# Patient Record
Sex: FEMALE | Race: BLACK OR AFRICAN AMERICAN | HISPANIC OR LATINO | Employment: OTHER | URBAN - METROPOLITAN AREA
[De-identification: names, ages, dates, MRNs, and addresses within clinical notes are randomized per-mention and may not be internally consistent; named-entity substitution may affect disease eponyms.]

---

## 2021-01-05 ENCOUNTER — OFFICE VISIT (OUTPATIENT)
Dept: CARDIOLOGY | Facility: CLINIC | Age: 71
End: 2021-01-05

## 2021-01-05 VITALS
DIASTOLIC BLOOD PRESSURE: 92 MMHG | TEMPERATURE: 98 F | SYSTOLIC BLOOD PRESSURE: 153 MMHG | BODY MASS INDEX: 37.28 KG/M2 | HEIGHT: 68 IN | HEART RATE: 108 BPM | WEIGHT: 246 LBS

## 2021-01-05 DIAGNOSIS — R94.31 ABNORMAL EKG: Primary | ICD-10-CM

## 2021-01-05 DIAGNOSIS — I48.91 ATRIAL FIBRILLATION, UNSPECIFIED TYPE (HCC): ICD-10-CM

## 2021-01-05 PROCEDURE — 99204 OFFICE O/P NEW MOD 45 MIN: CPT | Performed by: INTERNAL MEDICINE

## 2021-01-05 PROCEDURE — 93000 ELECTROCARDIOGRAM COMPLETE: CPT | Performed by: INTERNAL MEDICINE

## 2021-01-05 RX ORDER — DIAZEPAM 5 MG/1
5 TABLET ORAL 2 TIMES DAILY
COMMUNITY
Start: 2020-12-15

## 2021-01-05 RX ORDER — TRIAMTERENE AND HYDROCHLOROTHIAZIDE 37.5; 25 MG/1; MG/1
1 CAPSULE ORAL EVERY MORNING
COMMUNITY
End: 2021-12-28

## 2021-01-05 RX ORDER — OMEGA-3S/DHA/EPA/FISH OIL/D3 300MG-1000
400 CAPSULE ORAL DAILY
COMMUNITY
Start: 2020-12-19

## 2021-01-05 RX ORDER — OXYCODONE AND ACETAMINOPHEN 10; 325 MG/1; MG/1
1 TABLET ORAL EVERY 6 HOURS PRN
COMMUNITY
Start: 2020-12-19

## 2021-01-05 RX ORDER — APIXABAN 5 MG/1
5 TABLET, FILM COATED ORAL 2 TIMES DAILY
COMMUNITY
Start: 2020-12-08 | End: 2022-10-28 | Stop reason: SDUPTHER

## 2021-01-05 RX ORDER — AMLODIPINE BESYLATE 10 MG/1
TABLET ORAL DAILY
COMMUNITY
Start: 2020-10-10 | End: 2021-12-28

## 2021-01-05 RX ORDER — ACETAMINOPHEN 325 MG/1
650 TABLET ORAL EVERY 6 HOURS PRN
COMMUNITY
End: 2021-12-28

## 2021-01-05 RX ORDER — MULTIVIT WITH MINERALS/LUTEIN
250 TABLET ORAL DAILY
COMMUNITY

## 2021-01-05 RX ORDER — METOPROLOL SUCCINATE 50 MG/1
50 TABLET, EXTENDED RELEASE ORAL DAILY
Qty: 30 TABLET | Refills: 11 | Status: SHIPPED | OUTPATIENT
Start: 2021-01-05 | End: 2021-12-28 | Stop reason: SDUPTHER

## 2021-01-05 RX ORDER — ERGOCALCIFEROL 1.25 MG/1
CAPSULE ORAL
Status: ON HOLD | COMMUNITY
Start: 2020-11-11 | End: 2022-01-19

## 2021-01-05 RX ORDER — ALBUTEROL SULFATE 90 UG/1
2 AEROSOL, METERED RESPIRATORY (INHALATION) EVERY 4 HOURS PRN
COMMUNITY

## 2021-01-05 RX ORDER — TIZANIDINE 4 MG/1
TABLET ORAL
COMMUNITY
Start: 2020-10-19

## 2021-01-05 RX ORDER — CETIRIZINE HYDROCHLORIDE 10 MG/1
10 TABLET ORAL AS NEEDED
COMMUNITY

## 2021-01-05 RX ORDER — LANOLIN ALCOHOL/MO/W.PET/CERES
1000 CREAM (GRAM) TOPICAL DAILY
COMMUNITY

## 2021-01-05 NOTE — PROGRESS NOTES
AFIB, REFERRED BY DR. WESTFALL   Subjective:        [facility]  Cardiology Consult Note    Patient Identification:  Name: Denia Merritt  Age: 70 y.o.  Sex: female  :  1950  MRN: [id number]             CC    NEW ONSET AFIB    NOW ON ANTICOAGULATION  HPI      History of Present Illness:   70-year-old female with known history of hyperlipidemia here for the cardiac evaluation as well as establishment of the care as patient has been found to have new onset atrial fibrillation with rapid ventricular rate recently    Patient complaining of weakness and shortness of breath    Patient has been started on anticoagulation recently    Comorbid cardiac risk factors:     Past Medical History:  Past Medical History:   Diagnosis Date   • Hyperlipidemia      Past Surgical History:  History reviewed. No pertinent surgical history.   Allergies:  No Known Allergies  Home Meds:  (Not in a hospital admission)    Current Meds:   [unfilled]  Social History:   Social History     Tobacco Use   • Smoking status: Former Smoker   • Smokeless tobacco: Never Used   Substance Use Topics   • Alcohol use: Not Currently      Family History:  Family History   Problem Relation Age of Onset   • Hypertension Neg Hx    • Hyperlipidemia Neg Hx    • Heart failure Neg Hx    • Heart disease Neg Hx    • Heart attack Neg Hx         Review of Systems    Constitutional: No weakness,fatigue, fever, rigors, chills   Eyes: No vision changes, eye pain   ENT/oropharynx: No difficulty swallowing, sore throat, epistaxis, changes in hearing   Cardiovascular: No chest pain, chest tightness, palpitations, paroxysmal nocturnal dyspnea, orthopnea, diaphoresis, dizziness / syncopal episode   Respiratory:  Mild shortness of breath, dyspnea on exertion, cough, wheezing hemoptysis   Gastrointestinal: No abdominal pain, nausea, vomiting, diarrhea, bloody stools   Genitourinary: No hematuria, dysuria   Neurological: No headache, tremors, numbness,  one-sided  weakness    Musculoskeletal: No cramps, myalgias,  joint pain, joint swelling   Integument: No rash, edema           Constitutional:  Temp:  [98 °F (36.7 °C)] 98 °F (36.7 °C)  Heart Rate:  [108] 108  BP: (153)/(92) 153/92    Physical Exam   General:  Appears in no acute distress  Eyes: PERTL,  HEENT:  No JVD. Thyroid not visibly enlarged. No mucosal pallor or cyanosis  Respiratory: Respirations regular and unlabored at rest. BBS with good air entry in all fields. No crackles, rubs or wheezes auscultated  Cardiovascular: S1S2 Regular rate and rhythm. No murmur, rub or gallop auscultated. No carotid bruits. DP/PT pulses    . No pretibial pitting edema  Gastrointestinal: Abdomen soft, flat, non tender. Bowel sounds present. No hepatosplenomegaly. No ascites  Musculoskeletal: SEYMOUR x4. No abnormal movements  Extremities: No digital clubbing or cyanosis  Skin: Color pink. Skin warm and dry to touch. No rashes  No xanthoma  Neuro: AAO x3 CN II-XII grossly intact            ECG 12 Lead    Date/Time: 1/5/2021 3:13 PM  Performed by: Racheal Wilson MD  Authorized by: Racheal Wilson MD   Comparison: not compared with previous ECG   Previous ECG: no previous ECG available  Rhythm: atrial fibrillation    Clinical impression: abnormal EKG                Cardiographics  ECG:     Telemetry:    Echocardiogram:     Imaging  Chest X-ray:     Lab Review               @[email or web address]@              Assessment:/ Recommendations / Plan:   Patient Active Problem List   Diagnosis   • Abnormal EKG   • Atrial fibrillation (CMS/HCC)                    ICD-10-CM ICD-9-CM   1. Abnormal EKG  R94.31 794.31   2. Atrial fibrillation, unspecified type (CMS/HCC)  I48.91 427.31     1. Abnormal EKG  Considering the patient's symptoms as well as clinical situation and  EKG findings, along with cardiac risk factors, ischemic workup is necessary to rule out ischemic cardiomyopathy, stress induced arrhythmias, and functional capacity for  diagnosis as well as prognostic consideration    - Stress Test With Myocardial Perfusion (1 Day); Future  - Adult Transthoracic Echo Complete W/ Cont if Necessary Per Protocol    2. Atrial fibrillation, unspecified type (CMS/HCC)  Considering patient's medical condition as well as the risk factors, patient will require echocardiogram for further evaluation for the LV function, four-chamber evaluation, including the pressures, valvular function and  pericardial disease and pericardial effusion    - Stress Test With Myocardial Perfusion (1 Day); Future  - Adult Transthoracic Echo Complete W/ Cont if Necessary Per Protocol       ADD  TOPROL 50 MG    RIC, ECHO    1 MONTH FOR CV    Labs/tests ordered for am      Racheal Wilson MD  1/5/2021, 15:12 EST      EMR Dragon/Transcription:   Dictated utilizing Dragon dictation

## 2021-01-06 PROBLEM — R94.31 ABNORMAL EKG: Status: ACTIVE | Noted: 2021-01-06

## 2021-01-06 PROBLEM — I48.91 ATRIAL FIBRILLATION (HCC): Status: ACTIVE | Noted: 2021-01-06

## 2021-01-08 ENCOUNTER — TRANSCRIBE ORDERS (OUTPATIENT)
Dept: LAB | Facility: HOSPITAL | Age: 71
End: 2021-01-08

## 2021-01-08 DIAGNOSIS — Z01.818 OTHER SPECIFIED PRE-OPERATIVE EXAMINATION: Primary | ICD-10-CM

## 2021-01-09 ENCOUNTER — LAB (OUTPATIENT)
Dept: LAB | Facility: HOSPITAL | Age: 71
End: 2021-01-09

## 2021-01-09 DIAGNOSIS — Z01.818 OTHER SPECIFIED PRE-OPERATIVE EXAMINATION: ICD-10-CM

## 2021-01-09 PROCEDURE — U0004 COV-19 TEST NON-CDC HGH THRU: HCPCS

## 2021-01-09 PROCEDURE — C9803 HOPD COVID-19 SPEC COLLECT: HCPCS

## 2021-01-11 LAB — SARS-COV-2 RNA RESP QL NAA+PROBE: NOT DETECTED

## 2021-01-12 ENCOUNTER — HOSPITAL ENCOUNTER (OUTPATIENT)
Dept: CARDIOLOGY | Facility: HOSPITAL | Age: 71
Discharge: HOME | End: 2021-01-12

## 2021-01-12 ENCOUNTER — HOSPITAL ENCOUNTER (OUTPATIENT)
Dept: CARDIOLOGY | Facility: HOSPITAL | Age: 71
Discharge: HOME | End: 2021-01-12
Admitting: INTERNAL MEDICINE

## 2021-01-12 VITALS
HEART RATE: 75 BPM | HEIGHT: 68 IN | SYSTOLIC BLOOD PRESSURE: 146 MMHG | BODY MASS INDEX: 37.28 KG/M2 | DIASTOLIC BLOOD PRESSURE: 96 MMHG | WEIGHT: 246 LBS

## 2021-01-12 VITALS
RESPIRATION RATE: 16 BRPM | HEART RATE: 83 BPM | WEIGHT: 245 LBS | OXYGEN SATURATION: 97 % | SYSTOLIC BLOOD PRESSURE: 128 MMHG | BODY MASS INDEX: 37.13 KG/M2 | HEIGHT: 68 IN | DIASTOLIC BLOOD PRESSURE: 84 MMHG

## 2021-01-12 DIAGNOSIS — I48.91 ATRIAL FIBRILLATION, UNSPECIFIED TYPE (HCC): ICD-10-CM

## 2021-01-12 DIAGNOSIS — R94.31 ABNORMAL EKG: ICD-10-CM

## 2021-01-12 LAB
AORTIC DIMENSIONLESS INDEX: 0.9 (DI)
BH CV ECHO MEAS - ACS: 1.9 CM
BH CV ECHO MEAS - AO MAX PG (FULL): 1.3 MMHG
BH CV ECHO MEAS - AO MAX PG: 6.1 MMHG
BH CV ECHO MEAS - AO MEAN PG (FULL): 0.46 MMHG
BH CV ECHO MEAS - AO MEAN PG: 2.9 MMHG
BH CV ECHO MEAS - AO ROOT AREA (BSA CORRECTED): 1.2
BH CV ECHO MEAS - AO ROOT AREA: 5.7 CM^2
BH CV ECHO MEAS - AO ROOT DIAM: 2.7 CM
BH CV ECHO MEAS - AO V2 MAX: 123.8 CM/SEC
BH CV ECHO MEAS - AO V2 MEAN: 79.7 CM/SEC
BH CV ECHO MEAS - AO V2 VTI: 26.3 CM
BH CV ECHO MEAS - AVA(I,A): 2.4 CM^2
BH CV ECHO MEAS - AVA(I,D): 2.4 CM^2
BH CV ECHO MEAS - AVA(V,A): 2.4 CM^2
BH CV ECHO MEAS - AVA(V,D): 2.4 CM^2
BH CV ECHO MEAS - BSA(HAYCOCK): 2.4 M^2
BH CV ECHO MEAS - BSA: 2.2 M^2
BH CV ECHO MEAS - BZI_BMI: 37.4 KILOGRAMS/M^2
BH CV ECHO MEAS - BZI_METRIC_HEIGHT: 172.7 CM
BH CV ECHO MEAS - BZI_METRIC_WEIGHT: 111.6 KG
BH CV ECHO MEAS - EDV(CUBED): 124.9 ML
BH CV ECHO MEAS - EDV(MOD-SP2): 63 ML
BH CV ECHO MEAS - EDV(MOD-SP4): 77 ML
BH CV ECHO MEAS - EDV(TEICH): 118.2 ML
BH CV ECHO MEAS - EF(CUBED): 56.8 %
BH CV ECHO MEAS - EF(MOD-BP): 47.6 %
BH CV ECHO MEAS - EF(MOD-SP2): 49.2 %
BH CV ECHO MEAS - EF(MOD-SP4): 49.4 %
BH CV ECHO MEAS - EF(TEICH): 48.3 %
BH CV ECHO MEAS - ESV(CUBED): 54 ML
BH CV ECHO MEAS - ESV(MOD-SP2): 32 ML
BH CV ECHO MEAS - ESV(MOD-SP4): 39 ML
BH CV ECHO MEAS - ESV(TEICH): 61.1 ML
BH CV ECHO MEAS - FS: 24.4 %
BH CV ECHO MEAS - IVS/LVPW: 0.98
BH CV ECHO MEAS - IVSD: 0.85 CM
BH CV ECHO MEAS - LAT PEAK E' VEL: 14 CM/SEC
BH CV ECHO MEAS - LV DIASTOLIC VOL/BSA (35-75): 34.5 ML/M^2
BH CV ECHO MEAS - LV MASS(C)D: 148.8 GRAMS
BH CV ECHO MEAS - LV MASS(C)DI: 66.7 GRAMS/M^2
BH CV ECHO MEAS - LV MAX PG: 4.8 MMHG
BH CV ECHO MEAS - LV MEAN PG: 2.5 MMHG
BH CV ECHO MEAS - LV SYSTOLIC VOL/BSA (12-30): 17.5 ML/M^2
BH CV ECHO MEAS - LV V1 MAX: 109.4 CM/SEC
BH CV ECHO MEAS - LV V1 MEAN: 72.7 CM/SEC
BH CV ECHO MEAS - LV V1 VTI: 23.2 CM
BH CV ECHO MEAS - LVIDD: 5 CM
BH CV ECHO MEAS - LVIDS: 3.8 CM
BH CV ECHO MEAS - LVLD AP2: 6.8 CM
BH CV ECHO MEAS - LVLD AP4: 7.2 CM
BH CV ECHO MEAS - LVLS AP2: 5.7 CM
BH CV ECHO MEAS - LVLS AP4: 6.4 CM
BH CV ECHO MEAS - LVOT AREA (M): 2.8 CM^2
BH CV ECHO MEAS - LVOT AREA: 2.7 CM^2
BH CV ECHO MEAS - LVOT DIAM: 1.9 CM
BH CV ECHO MEAS - LVPWD: 0.87 CM
BH CV ECHO MEAS - MED PEAK E' VEL: 10.8 CM/SEC
BH CV ECHO MEAS - MR MAX PG: 80.1 MMHG
BH CV ECHO MEAS - MR MAX VEL: 447.4 CM/SEC
BH CV ECHO MEAS - MV DEC SLOPE: 687.3 CM/SEC^2
BH CV ECHO MEAS - MV DEC TIME: 166 SEC
BH CV ECHO MEAS - MV E MAX VEL: 126.4 CM/SEC
BH CV ECHO MEAS - MV MAX PG: 6.7 MMHG
BH CV ECHO MEAS - MV MEAN PG: 2.6 MMHG
BH CV ECHO MEAS - MV P1/2T MAX VEL: 137.9 CM/SEC
BH CV ECHO MEAS - MV P1/2T: 58.8 MSEC
BH CV ECHO MEAS - MV V2 MAX: 129.2 CM/SEC
BH CV ECHO MEAS - MV V2 MEAN: 74.1 CM/SEC
BH CV ECHO MEAS - MV V2 VTI: 24.1 CM
BH CV ECHO MEAS - MVA P1/2T LCG: 1.6 CM^2
BH CV ECHO MEAS - MVA(P1/2T): 3.7 CM^2
BH CV ECHO MEAS - MVA(VTI): 2.6 CM^2
BH CV ECHO MEAS - PA ACC TIME: 0.04 SEC
BH CV ECHO MEAS - PA MAX PG: 3.2 MMHG
BH CV ECHO MEAS - PA PR(ACCEL): 61.6 MMHG
BH CV ECHO MEAS - PA V2 MAX: 89.5 CM/SEC
BH CV ECHO MEAS - PULM DIAS VEL: 35.9 CM/SEC
BH CV ECHO MEAS - PULM S/D: 0.91
BH CV ECHO MEAS - PULM SYS VEL: 32.8 CM/SEC
BH CV ECHO MEAS - RAP SYSTOLE: 3 MMHG
BH CV ECHO MEAS - RVSP: 30 MMHG
BH CV ECHO MEAS - SI(AO): 66.8 ML/M^2
BH CV ECHO MEAS - SI(CUBED): 31.8 ML/M^2
BH CV ECHO MEAS - SI(LVOT): 28.3 ML/M^2
BH CV ECHO MEAS - SI(MOD-SP2): 13.9 ML/M^2
BH CV ECHO MEAS - SI(MOD-SP4): 17 ML/M^2
BH CV ECHO MEAS - SI(TEICH): 25.5 ML/M^2
BH CV ECHO MEAS - SV(AO): 149.1 ML
BH CV ECHO MEAS - SV(CUBED): 70.9 ML
BH CV ECHO MEAS - SV(LVOT): 63.1 ML
BH CV ECHO MEAS - SV(MOD-SP2): 31 ML
BH CV ECHO MEAS - SV(MOD-SP4): 38 ML
BH CV ECHO MEAS - SV(TEICH): 57 ML
BH CV ECHO MEAS - TAPSE (>1.6): 2.2 CM
BH CV ECHO MEAS - TR MAX PG: 27 MMHG
BH CV ECHO MEAS - TR MAX VEL: 261.8 CM/SEC
BH CV ECHO MEASUREMENTS AVERAGE E/E' RATIO: 10.19
BH CV XLRA - RV BASE: 3.5 CM
BH CV XLRA - RV LENGTH: 6.2 CM
BH CV XLRA - RV MID: 2.7 CM
BH CV XLRA - TDI S': 14.9 CM/SEC
LEFT ATRIUM VOLUME INDEX: 31.3 ML/M2

## 2021-01-12 PROCEDURE — 78452 HT MUSCLE IMAGE SPECT MULT: CPT | Performed by: INTERNAL MEDICINE

## 2021-01-12 PROCEDURE — 93306 TTE W/DOPPLER COMPLETE: CPT

## 2021-01-12 PROCEDURE — 93016 CV STRESS TEST SUPVJ ONLY: CPT | Performed by: NURSE PRACTITIONER

## 2021-01-12 PROCEDURE — 78452 HT MUSCLE IMAGE SPECT MULT: CPT

## 2021-01-12 PROCEDURE — 93306 TTE W/DOPPLER COMPLETE: CPT | Performed by: INTERNAL MEDICINE

## 2021-01-12 PROCEDURE — A9500 TC99M SESTAMIBI: HCPCS | Performed by: INTERNAL MEDICINE

## 2021-01-12 PROCEDURE — 25010000002 REGADENOSON 0.4 MG/5ML SOLUTION: Performed by: INTERNAL MEDICINE

## 2021-01-12 PROCEDURE — 93017 CV STRESS TEST TRACING ONLY: CPT

## 2021-01-12 PROCEDURE — 0 TECHNETIUM SESTAMIBI: Performed by: INTERNAL MEDICINE

## 2021-01-12 PROCEDURE — 93018 CV STRESS TEST I&R ONLY: CPT | Performed by: INTERNAL MEDICINE

## 2021-01-12 RX ADMIN — TECHNETIUM TC 99M SESTAMIBI 1 DOSE: 1 INJECTION INTRAVENOUS at 10:48

## 2021-01-12 RX ADMIN — REGADENOSON 0.4 MG: 0.08 INJECTION, SOLUTION INTRAVENOUS at 10:48

## 2021-01-12 RX ADMIN — TECHNETIUM TC 99M SESTAMIBI 1 DOSE: 1 INJECTION INTRAVENOUS at 08:05

## 2021-01-14 LAB
BH CV STRESS BP STAGE 1: NORMAL
BH CV STRESS COMMENTS STAGE 1: NORMAL
BH CV STRESS DOSE REGADENOSON STAGE 1: 0.4
BH CV STRESS DURATION MIN STAGE 1: 2
BH CV STRESS DURATION SEC STAGE 1: 6
BH CV STRESS GRADE STAGE 1: 0
BH CV STRESS HR STAGE 1: 150
BH CV STRESS METS STAGE 1: 1.5
BH CV STRESS PROTOCOL 1: NORMAL
BH CV STRESS RECOVERY BP: NORMAL MMHG
BH CV STRESS RECOVERY HR: 104 BPM
BH CV STRESS RECOVERY O2: 98 %
BH CV STRESS SPEED STAGE 1: 0.7
BH CV STRESS STAGE 1: 1
LV EF NUC BP: 57 %
MAXIMAL PREDICTED HEART RATE: 149 BPM
PERCENT MAX PREDICTED HR: 100.67 %
STRESS BASELINE BP: NORMAL MMHG
STRESS BASELINE HR: 89 BPM
STRESS O2 SAT REST: 97 %
STRESS PERCENT HR: 118 %
STRESS POST ESTIMATED WORKLOAD: 1.5 METS
STRESS POST EXERCISE DUR MIN: 2 MIN
STRESS POST EXERCISE DUR SEC: 6 SEC
STRESS POST PEAK BP: NORMAL MMHG
STRESS POST PEAK HR: 150 BPM
STRESS TARGET HR: 127 BPM

## 2021-12-28 ENCOUNTER — OFFICE VISIT (OUTPATIENT)
Dept: CARDIOLOGY | Facility: CLINIC | Age: 71
End: 2021-12-28

## 2021-12-28 VITALS
BODY MASS INDEX: 35.92 KG/M2 | HEIGHT: 68 IN | WEIGHT: 237 LBS | DIASTOLIC BLOOD PRESSURE: 87 MMHG | HEART RATE: 64 BPM | SYSTOLIC BLOOD PRESSURE: 124 MMHG

## 2021-12-28 DIAGNOSIS — I48.91 ATRIAL FIBRILLATION, UNSPECIFIED TYPE (HCC): ICD-10-CM

## 2021-12-28 DIAGNOSIS — R94.31 ABNORMAL EKG: Primary | ICD-10-CM

## 2021-12-28 DIAGNOSIS — I10 PRIMARY HYPERTENSION: ICD-10-CM

## 2021-12-28 PROCEDURE — 99213 OFFICE O/P EST LOW 20 MIN: CPT | Performed by: NURSE PRACTITIONER

## 2021-12-28 PROCEDURE — 93000 ELECTROCARDIOGRAM COMPLETE: CPT | Performed by: NURSE PRACTITIONER

## 2021-12-28 RX ORDER — METOPROLOL SUCCINATE 50 MG/1
75 TABLET, EXTENDED RELEASE ORAL DAILY
Qty: 30 TABLET | Refills: 11 | Status: SHIPPED | OUTPATIENT
Start: 2021-12-28 | End: 2022-01-21 | Stop reason: HOSPADM

## 2021-12-28 RX ORDER — POTASSIUM CHLORIDE 750 MG/1
10 TABLET, FILM COATED, EXTENDED RELEASE ORAL DAILY
COMMUNITY
Start: 2021-12-23

## 2021-12-28 RX ORDER — FUROSEMIDE 20 MG/1
20 TABLET ORAL DAILY
COMMUNITY
Start: 2021-12-23

## 2021-12-28 NOTE — PROGRESS NOTES
Subjective:        Denia Merritt is a 71 y.o. female who here for follow up    No chief complaint on file.    Referred by Dr. Morataya for A. fib RVR    HPI    This is a 71-year-old female, who is noted myself.  She has a history of chronic pain, hyperlipidemia, CKD stage III, hypertension, herniated disc, neuropathy and spinal stenosis.  She comes into referral today for atrial fibrillation with RVR.  Her EKG showed fibrillation with heart rate of 135.  No recent lipid panel noted.    Stress test January 2021 indicated a normal myocardial perfusion study with no evidence of ischemia.  Echo in January 2021 revealed EF 46 to 50%, LV diastolic function was indeterminate and negative saline test for PFO.    The following portions of the patient's history were reviewed and updated as appropriate: allergies, current medications, past family history, past medical history, past social history, past surgical history and problem list.    Past Medical History:   Diagnosis Date   • Arrhythmia     atrial fib   • Chronic pain    • Hyperlipidemia    • Hypertension    • Lumbar herniated disc    • Neuropathy    • Spinal stenosis          reports that she has quit smoking. She has never used smokeless tobacco. She reports previous alcohol use. She reports that she does not use drugs.     Family History   Problem Relation Age of Onset   • Heart disease Mother    • Diabetes Father    • Hypertension Neg Hx    • Hyperlipidemia Neg Hx    • Heart failure Neg Hx    • Heart attack Neg Hx        ROS     Review of Systems  Constitutional: No wt loss, fever, fatigue  Gastrointestinal: No nausea, abdominal pain  Behavioral/Psych: No insomnia or anxiety  Cardiovascular : Denies chest pain and shortness of breath.      Objective:           Vitals and nursing note reviewed.   Constitutional:       Appearance: Well-developed.   HENT:      Head: Normocephalic.      Right Ear: External ear normal.      Left Ear: External ear normal.   Neck:       Vascular: No JVD.   Pulmonary:      Effort: Pulmonary effort is normal. No respiratory distress.      Breath sounds: Normal breath sounds. No stridor. No rales.   Cardiovascular:      Normal rate. Regular rhythm.      No gallop.   Pulses:     Intact distal pulses.   Abdominal:      General: Bowel sounds are normal. There is no distension.      Palpations: Abdomen is soft.      Tenderness: There is no abdominal tenderness. There is no guarding.   Musculoskeletal: Normal range of motion.         General: No tenderness.      Cervical back: Normal range of motion. Skin:     General: Skin is warm.   Neurological:      Mental Status: Alert and oriented to person, place, and time.      Deep Tendon Reflexes: Reflexes are normal and symmetric.   Psychiatric:         Judgment: Judgment normal.           ECG 12 Lead    Date/Time: 12/28/2021 10:52 AM  Performed by: Nazia Baird APRN  Authorized by: Nazia Baird APRN   Comparison: compared with previous ECG from 1/5/2021  Rhythm: atrial fibrillation  BPM: 135              January 2021  Interpretation Summary       · Findings consistent with a normal ECG stress test.  · Left ventricular ejection fraction is normal. (Calculated EF = 57%).  · Myocardial perfusion imaging indicates a normal myocardial perfusion study with no evidence of ischemia.  · Impressions are consistent with a low risk study.     Asymptomatic for chest pain. ECG is negative for ischemia.   Ectopy: baseline rhythm is atrial fibrillation with occasional PVC's, no PVC's with exercise, PVC's return in exercise.   B/P is Appropriate for Beta-blocker therapy, Baseline 128/84, Peak (post Lexiscan)128/84  Recovery:  1 min 130/80  2 min 128/78  4 min 128/80  5 min 128/78  Pharmacologic study due to inability to tolerate increasing speed and grade of treadmill due to orthopedic issues, chronic pain and Beta-blocker therapy.  Participated in Low Level exercise and tolerance is poor.     Supervised by:   Nazia HARDY.      Interpretation Summary    · Estimated right ventricular systolic pressure from tricuspid regurgitation is normal (<35 mmHg).  · The left ventricular cavity is borderline dilated.  · Estimated left ventricular EF was in agreement with the calculated left ventricular EF. Left ventricular ejection fraction appears to be 46 - 50%.  · Left ventricular diastolic function was indeterminate.  · Saline test neg for PFO          Current Outpatient Medications:   •  acetaminophen (TYLENOL) 325 MG tablet, Take 650 mg by mouth Every 6 (Six) Hours As Needed for Mild Pain ., Disp: , Rfl:   •  albuterol sulfate  (90 Base) MCG/ACT inhaler, Inhale 2 puffs Every 4 (Four) Hours As Needed for Wheezing., Disp: , Rfl:   •  amLODIPine (NORVASC) 10 MG tablet, Take  by mouth Daily., Disp: , Rfl:   •  cetirizine (zyrTEC) 10 MG tablet, Take 10 mg by mouth Daily., Disp: , Rfl:   •  cholecalciferol (VITAMIN D3) 10 MCG (400 UNIT) tablet, Take 400 Units by mouth Daily., Disp: , Rfl:   •  diazePAM (VALIUM) 5 MG tablet, Take 5 mg by mouth 2 (Two) Times a Day., Disp: , Rfl:   •  Eliquis 5 MG tablet tablet, Take 5 mg by mouth 2 (Two) Times a Day., Disp: , Rfl:   •  metoprolol succinate XL (TOPROL-XL) 50 MG 24 hr tablet, Take 1 tablet by mouth Daily., Disp: 30 tablet, Rfl: 11  •  oxyCODONE-acetaminophen (PERCOCET)  MG per tablet, Take 1 tablet by mouth Every 6 (Six) Hours As Needed., Disp: , Rfl:   •  tiZANidine (ZANAFLEX) 4 MG tablet, TK 1 T PO TID PRN, Disp: , Rfl:   •  triamterene-hydrochlorothiazide (DYAZIDE) 37.5-25 MG per capsule, Take 1 capsule by mouth Every Morning., Disp: , Rfl:   •  vitamin B-12 (CYANOCOBALAMIN) 1000 MCG tablet, Take 1,000 mcg by mouth Daily., Disp: , Rfl:   •  vitamin C (ASCORBIC ACID) 250 MG tablet, Take 250 mg by mouth Daily., Disp: , Rfl:   •  vitamin D (ERGOCALCIFEROL) 1.25 MG (46837 UT) capsule capsule, TK 1 C PO Q WK, Disp: , Rfl:      Assessment:        Patient Active  Problem List   Diagnosis   • Abnormal EKG   • Atrial fibrillation (HCC)               Plan:   1.  A. fib with RVR: She was referred by Dr. Morataya's office for abnormal EKG with A. fib and RVR.  Her ischemic work-up was negative study in January 2021.  EKG shows atrial fibrillation with heart rate of 135.  She has declined to be admitted for IV amiodarone therapy.  She states at  this time she cannot go into the hospital due to her money situation.  She would like to discuss with her family. I will increase her beta-blocker and discontinue her amlodipine.  She will need her blood pressure and heart rate monitored while at home.  She is to call with any concerns of systolic less than 100 or heart rate less than 60.    2.  Hypertension: Today in the office her blood pressures controlled on current medications.  Avoid ACEs due to her CKD stage III per nephrology.    Educated patient on exercising for at least 30 minutes a day for 2 to 3 days a week. Importance of controlling hypertension and blood pressure checkup on the regular basis has been explained. Hypertension as a silent killer has been discussed. Risk reduction of the weight and regular exercises to control the hypertension has been explained.               No diagnosis found.    There are no diagnoses linked to this encounter.    COUNSELING: jennifer Girard was given to patient for the following topics: diagnostic results, risk factor reductions, impressions, risks and benefits of treatment options and importance of treatment compliance .       SMOKING COUNSELING: denies    She states she has not missed any doses of her Eliquis.  I would like her to see Dr. BOWEN in 1 but she says that is too soon and she would like to see him in 3 weeks.  She has been advised to go to the emergency room with any symptoms of her atrial fibrillation.  She verbalized understanding.    Return to ER per EMS for any recurrent symptoms including, but not limited to: chest  pain/pressure/tightness, weakness, Shortness of breath, dizziness, palpitations, near syncope or syncope          Sincerely,   HAYLEY Moe  [facility]  12/28/21  10:31 EST    MIKE Dragon/Transcription disclaimer:   Much of this encounter note is an electronic transcription/translation of spoken language to printed text. The electronic translation of spoken language may permit erroneous, or at times, nonsensical words or phrases to be inadvertently transcribed; Although I have reviewed the note for such errors, some may still exist.

## 2022-01-13 ENCOUNTER — OFFICE VISIT (OUTPATIENT)
Dept: CARDIOLOGY | Facility: CLINIC | Age: 72
End: 2022-01-13

## 2022-01-13 VITALS
WEIGHT: 235 LBS | SYSTOLIC BLOOD PRESSURE: 158 MMHG | HEART RATE: 102 BPM | HEIGHT: 68 IN | BODY MASS INDEX: 35.61 KG/M2 | DIASTOLIC BLOOD PRESSURE: 98 MMHG

## 2022-01-13 DIAGNOSIS — Z79.01 CHRONIC ANTICOAGULATION: ICD-10-CM

## 2022-01-13 DIAGNOSIS — Z92.29 HISTORY OF AMIODARONE THERAPY: ICD-10-CM

## 2022-01-13 DIAGNOSIS — I48.91 ATRIAL FIBRILLATION, UNSPECIFIED TYPE: ICD-10-CM

## 2022-01-13 DIAGNOSIS — Z01.810 PRE-OPERATIVE CARDIOVASCULAR EXAMINATION: Primary | ICD-10-CM

## 2022-01-13 PROCEDURE — 99214 OFFICE O/P EST MOD 30 MIN: CPT | Performed by: INTERNAL MEDICINE

## 2022-01-13 PROCEDURE — 93000 ELECTROCARDIOGRAM COMPLETE: CPT | Performed by: INTERNAL MEDICINE

## 2022-01-13 NOTE — PROGRESS NOTES
Per Mj HARDY   2 wk follow up for AFIB with RVR    Subjective:        Denia Merritt is a 71 y.o. female who here for follow up    CC  AFIB  HPI  71 years old female with atrial fibrillation on amiodarone therapy on chronic anticoagulation here for the follow-up with no complaints of chest pains tightness heaviness or the pressure sensation     Problems Addressed this Visit        Cardiac and Vasculature    Atrial fibrillation (HCC)    Relevant Orders    Cardioversion External in Cardiology Department (Completed)    COVID PRE-OP / PRE-PROCEDURE SCREENING ORDER (NO ISOLATION) - Swab, Nasopharynx    CBC & Differential    Basic Metabolic Panel    aPTT    Protime-INR    Pre-operative cardiovascular examination - Primary    Relevant Orders    COVID PRE-OP / PRE-PROCEDURE SCREENING ORDER (NO ISOLATION) - Swab, Nasopharynx    CBC & Differential    Basic Metabolic Panel    aPTT    Protime-INR    History of amiodarone therapy       Coag and Thromboembolic    Chronic anticoagulation      Diagnoses       Codes Comments    Pre-operative cardiovascular examination    -  Primary ICD-10-CM: Z01.810  ICD-9-CM: V72.81     Atrial fibrillation, unspecified type (HCC)     ICD-10-CM: I48.91  ICD-9-CM: 427.31     History of amiodarone therapy     ICD-10-CM: Z92.29  ICD-9-CM: V87.49     Chronic anticoagulation     ICD-10-CM: Z79.01  ICD-9-CM: V58.61         .    The following portions of the patient's history were reviewed and updated as appropriate: allergies, current medications, past family history, past medical history, past social history, past surgical history and problem list.    Past Medical History:   Diagnosis Date   • Arrhythmia     atrial fib   • Chronic pain    • Hyperlipidemia    • Hypertension    • Lumbar herniated disc    • Neuropathy    • Spinal stenosis      reports that she has quit smoking. She has never used smokeless tobacco. She reports previous alcohol use. She reports that she does not use drugs.   Family History  "  Problem Relation Age of Onset   • Heart disease Mother    • Diabetes Father    • Hypertension Neg Hx    • Hyperlipidemia Neg Hx    • Heart failure Neg Hx    • Heart attack Neg Hx        Review of Systems  Constitutional: No wt loss, fever, fatigue  Gastrointestinal: No nausea, abdominal pain  Behavioral/Psych: No insomnia or anxiety   Cardiovascular no chest pains or tightness in the chest  Objective:       Physical Exam  /98 Comment: maual  Pulse 102   Ht 172.7 cm (68\")   Wt 107 kg (235 lb)   BMI 35.73 kg/m²   General appearance: No acute changes   Neck: Trachea midline; NECK, supple, no thyromegaly or lymphadenopathy   Lungs: Normal size and shape, normal breath sounds, equal distribution of air, no rales and rhonchi   CV: S1-S2 regular, no murmurs, no rub, no gallop   Abdomen: Soft, nontender; no masses , no abnormal abdominal sounds   Extremities: No deformity , normal color , no peripheral edema   Skin: Normal temperature, turgor and texture; no rash, ulcers            ECG 12 Lead    Date/Time: 1/13/2022 12:23 PM  Performed by: Racheal Wilson MD  Authorized by: Racheal Wilson MD   Comparison: compared with previous ECG   Similar to previous ECG  Rhythm: atrial fibrillation    Clinical impression: abnormal EKG              Echocardiogram:        Current Outpatient Medications:   •  albuterol sulfate  (90 Base) MCG/ACT inhaler, Inhale 2 puffs Every 4 (Four) Hours As Needed for Wheezing., Disp: , Rfl:   •  Calcet Petites 200-250 MG-UNIT tablet, TAKE TWO TABLETS BY MOUTH EVERY DAY, Disp: , Rfl:   •  cetirizine (zyrTEC) 10 MG tablet, Take 10 mg by mouth Daily., Disp: , Rfl:   •  cholecalciferol (VITAMIN D3) 10 MCG (400 UNIT) tablet, Take 400 Units by mouth Daily., Disp: , Rfl:   •  diazePAM (VALIUM) 5 MG tablet, Take 5 mg by mouth 2 (Two) Times a Day., Disp: , Rfl:   •  Eliquis 5 MG tablet tablet, Take 5 mg by mouth 2 (Two) Times a Day., Disp: , Rfl:   •  furosemide (LASIX) 20 MG " tablet, Take 20 mg by mouth Daily., Disp: , Rfl:   •  metoprolol succinate XL (TOPROL-XL) 50 MG 24 hr tablet, Take 1.5 tablets by mouth Daily., Disp: 30 tablet, Rfl: 11  •  oxyCODONE-acetaminophen (PERCOCET)  MG per tablet, Take 1 tablet by mouth Every 6 (Six) Hours As Needed., Disp: , Rfl:   •  potassium chloride 10 MEQ CR tablet, Take 10 mEq by mouth Daily., Disp: , Rfl:   •  tiZANidine (ZANAFLEX) 4 MG tablet, TK 1 T PO TID PRN, Disp: , Rfl:   •  vitamin B-12 (CYANOCOBALAMIN) 1000 MCG tablet, Take 1,000 mcg by mouth Daily., Disp: , Rfl:   •  vitamin C (ASCORBIC ACID) 250 MG tablet, Take 250 mg by mouth Daily., Disp: , Rfl:   •  vitamin D (ERGOCALCIFEROL) 1.25 MG (17091 UT) capsule capsule, TK 1 C PO Q WK, Disp: , Rfl:    Assessment:        Patient Active Problem List   Diagnosis   • Abnormal EKG   • Atrial fibrillation (HCC)               Plan:            ICD-10-CM ICD-9-CM   1. Pre-operative cardiovascular examination  Z01.810 V72.81   2. Atrial fibrillation, unspecified type (HCC)  I48.91 427.31   3. History of amiodarone therapy  Z92.29 V87.49   4. Chronic anticoagulation  Z79.01 V58.61     1. Atrial fibrillation, unspecified type (HCC)  We will need cardioversion  - Cardioversion External in Cardiology Department  - COVID PRE-OP / PRE-PROCEDURE SCREENING ORDER (NO ISOLATION) - Swab, Nasopharynx  - CBC & Differential  - Basic Metabolic Panel  - aPTT  - Protime-INR    2. Pre-operative cardiovascular examination    - COVID PRE-OP / PRE-PROCEDURE SCREENING ORDER (NO ISOLATION) - Swab, Nasopharynx  - CBC & Differential  - Basic Metabolic Panel  - aPTT  - Protime-INR    3. History of amiodarone therapy  Denia Merritt IS ON AMIODARONE,   Significant side effects associated with this medication has been explained     Pros and cons of the medications has been discussed, decision has been to continue the medication   6 months to yearly checkup for eye examination, thyroid function test, chest x-ray and liver  function test has been advised    Patient understands well      4. Chronic anticoagulation  Pros and cons as well as indication of the anticoagulation has been explained to the patient in detail    There are no obvious complications at this stage    Risk of  the bleedings has been explained    Need for the regular blood workup and adjust the dose has been explained    Need for proper follow-up on anticoagulation also has been explained       ADMIT, START AMIODARONE, CV    COUNSELING:    Denia Girard was given to patient for the following topics: diagnostic results, risk factor reductions, impressions, risks and benefits of treatment options and importance of treatment compliance .       SMOKING COUNSELING:    [unfilled]    Dictated using Dragon dictation

## 2022-01-19 ENCOUNTER — APPOINTMENT (OUTPATIENT)
Dept: CARDIOLOGY | Facility: HOSPITAL | Age: 72
End: 2022-01-19

## 2022-01-19 ENCOUNTER — HOSPITAL ENCOUNTER (OUTPATIENT)
Facility: HOSPITAL | Age: 72
Setting detail: OBSERVATION
Discharge: HOME | End: 2022-01-21
Attending: INTERNAL MEDICINE | Admitting: INTERNAL MEDICINE

## 2022-01-19 ENCOUNTER — APPOINTMENT (OUTPATIENT)
Dept: GENERAL RADIOLOGY | Facility: HOSPITAL | Age: 72
End: 2022-01-19

## 2022-01-19 LAB
ALBUMIN SERPL-MCNC: 4.2 G/DL (ref 3.5–5.2)
ALBUMIN/GLOB SERPL: 1.4 G/DL
ALP SERPL-CCNC: 76 U/L (ref 39–117)
ALT SERPL W P-5'-P-CCNC: 14 U/L (ref 1–33)
ANION GAP SERPL CALCULATED.3IONS-SCNC: 9 MMOL/L (ref 5–15)
APTT PPP: 32.6 SECONDS (ref 22.7–35.4)
AST SERPL-CCNC: 19 U/L (ref 1–32)
BASOPHILS # BLD AUTO: 0.04 10*3/MM3 (ref 0–0.2)
BASOPHILS NFR BLD AUTO: 0.8 % (ref 0–1.5)
BILIRUB SERPL-MCNC: 0.7 MG/DL (ref 0–1.2)
BUN SERPL-MCNC: 15 MG/DL (ref 8–23)
BUN/CREAT SERPL: 15.3 (ref 7–25)
CALCIUM SPEC-SCNC: 9.4 MG/DL (ref 8.6–10.5)
CHLORIDE SERPL-SCNC: 105 MMOL/L (ref 98–107)
CO2 SERPL-SCNC: 28 MMOL/L (ref 22–29)
CREAT SERPL-MCNC: 0.98 MG/DL (ref 0.57–1)
DEPRECATED RDW RBC AUTO: 41.4 FL (ref 37–54)
EOSINOPHIL # BLD AUTO: 0.14 10*3/MM3 (ref 0–0.4)
EOSINOPHIL NFR BLD AUTO: 2.7 % (ref 0.3–6.2)
ERYTHROCYTE [DISTWIDTH] IN BLOOD BY AUTOMATED COUNT: 13.1 % (ref 12.3–15.4)
GFR SERPL CREATININE-BSD FRML MDRD: 68 ML/MIN/1.73
GLOBULIN UR ELPH-MCNC: 2.9 GM/DL
GLUCOSE SERPL-MCNC: 131 MG/DL (ref 65–99)
HCT VFR BLD AUTO: 41.9 % (ref 34–46.6)
HGB BLD-MCNC: 12.6 G/DL (ref 12–15.9)
IMM GRANULOCYTES # BLD AUTO: 0.01 10*3/MM3 (ref 0–0.05)
IMM GRANULOCYTES NFR BLD AUTO: 0.2 % (ref 0–0.5)
INR PPP: 1.34 (ref 0.9–1.1)
LYMPHOCYTES # BLD AUTO: 1.22 10*3/MM3 (ref 0.7–3.1)
LYMPHOCYTES NFR BLD AUTO: 23.7 % (ref 19.6–45.3)
MAGNESIUM SERPL-MCNC: 1.9 MG/DL (ref 1.6–2.4)
MCH RBC QN AUTO: 26.1 PG (ref 26.6–33)
MCHC RBC AUTO-ENTMCNC: 30.1 G/DL (ref 31.5–35.7)
MCV RBC AUTO: 86.9 FL (ref 79–97)
MONOCYTES # BLD AUTO: 0.34 10*3/MM3 (ref 0.1–0.9)
MONOCYTES NFR BLD AUTO: 6.6 % (ref 5–12)
NEUTROPHILS NFR BLD AUTO: 3.39 10*3/MM3 (ref 1.7–7)
NEUTROPHILS NFR BLD AUTO: 66 % (ref 42.7–76)
NRBC BLD AUTO-RTO: 0 /100 WBC (ref 0–0.2)
NT-PROBNP SERPL-MCNC: 3498 PG/ML (ref 0–900)
PLATELET # BLD AUTO: 212 10*3/MM3 (ref 140–450)
PMV BLD AUTO: 11.7 FL (ref 6–12)
POTASSIUM SERPL-SCNC: 4 MMOL/L (ref 3.5–5.2)
PROT SERPL-MCNC: 7.1 G/DL (ref 6–8.5)
PROTHROMBIN TIME: 16.4 SECONDS (ref 11.7–14.2)
QT INTERVAL: 318 MS
RBC # BLD AUTO: 4.82 10*6/MM3 (ref 3.77–5.28)
SARS-COV-2 ORF1AB RESP QL NAA+PROBE: NOT DETECTED
SODIUM SERPL-SCNC: 142 MMOL/L (ref 136–145)
TROPONIN T SERPL-MCNC: <0.01 NG/ML (ref 0–0.03)
TSH SERPL DL<=0.05 MIU/L-ACNC: 0.84 UIU/ML (ref 0.27–4.2)
WBC NRBC COR # BLD: 5.14 10*3/MM3 (ref 3.4–10.8)

## 2022-01-19 PROCEDURE — 93010 ELECTROCARDIOGRAM REPORT: CPT | Performed by: INTERNAL MEDICINE

## 2022-01-19 PROCEDURE — U0004 COV-19 TEST NON-CDC HGH THRU: HCPCS | Performed by: INTERNAL MEDICINE

## 2022-01-19 PROCEDURE — 85730 THROMBOPLASTIN TIME PARTIAL: CPT

## 2022-01-19 PROCEDURE — 93005 ELECTROCARDIOGRAM TRACING: CPT

## 2022-01-19 PROCEDURE — 83735 ASSAY OF MAGNESIUM: CPT

## 2022-01-19 PROCEDURE — 96365 THER/PROPH/DIAG IV INF INIT: CPT

## 2022-01-19 PROCEDURE — 84484 ASSAY OF TROPONIN QUANT: CPT

## 2022-01-19 PROCEDURE — G0378 HOSPITAL OBSERVATION PER HR: HCPCS

## 2022-01-19 PROCEDURE — 25010000002 AMIODARONE IN DEXTROSE 5% 150-4.21 MG/100ML-% SOLUTION

## 2022-01-19 PROCEDURE — 96376 TX/PRO/DX INJ SAME DRUG ADON: CPT

## 2022-01-19 PROCEDURE — 85610 PROTHROMBIN TIME: CPT

## 2022-01-19 PROCEDURE — 96366 THER/PROPH/DIAG IV INF ADDON: CPT

## 2022-01-19 PROCEDURE — 83880 ASSAY OF NATRIURETIC PEPTIDE: CPT

## 2022-01-19 PROCEDURE — 71045 X-RAY EXAM CHEST 1 VIEW: CPT

## 2022-01-19 PROCEDURE — 25010000002 AMIODARONE IN DEXTROSE 5% 360-4.14 MG/200ML-% SOLUTION

## 2022-01-19 PROCEDURE — 80050 GENERAL HEALTH PANEL: CPT

## 2022-01-19 PROCEDURE — 99219 PR INITIAL OBSERVATION CARE/DAY 50 MINUTES: CPT | Performed by: INTERNAL MEDICINE

## 2022-01-19 RX ORDER — NITROGLYCERIN 0.4 MG/1
0.4 TABLET SUBLINGUAL
Status: DISCONTINUED | OUTPATIENT
Start: 2022-01-19 | End: 2022-01-21 | Stop reason: HOSPADM

## 2022-01-19 RX ORDER — PANTOPRAZOLE SODIUM 40 MG/1
40 TABLET, DELAYED RELEASE ORAL
Status: DISCONTINUED | OUTPATIENT
Start: 2022-01-20 | End: 2022-01-21 | Stop reason: HOSPADM

## 2022-01-19 RX ORDER — FUROSEMIDE 20 MG/1
20 TABLET ORAL DAILY
Status: DISCONTINUED | OUTPATIENT
Start: 2022-01-19 | End: 2022-01-21 | Stop reason: HOSPADM

## 2022-01-19 RX ORDER — ALBUTEROL SULFATE 2.5 MG/3ML
2.5 SOLUTION RESPIRATORY (INHALATION) EVERY 6 HOURS PRN
Status: DISCONTINUED | OUTPATIENT
Start: 2022-01-19 | End: 2022-01-21 | Stop reason: HOSPADM

## 2022-01-19 RX ORDER — POTASSIUM CHLORIDE 750 MG/1
10 TABLET, FILM COATED, EXTENDED RELEASE ORAL DAILY
Status: DISCONTINUED | OUTPATIENT
Start: 2022-01-19 | End: 2022-01-21 | Stop reason: HOSPADM

## 2022-01-19 RX ORDER — SODIUM CHLORIDE 0.9 % (FLUSH) 0.9 %
10 SYRINGE (ML) INJECTION EVERY 12 HOURS SCHEDULED
Status: DISCONTINUED | OUTPATIENT
Start: 2022-01-19 | End: 2022-01-21 | Stop reason: HOSPADM

## 2022-01-19 RX ORDER — SODIUM CHLORIDE 0.9 % (FLUSH) 0.9 %
10 SYRINGE (ML) INJECTION AS NEEDED
Status: DISCONTINUED | OUTPATIENT
Start: 2022-01-19 | End: 2022-01-21 | Stop reason: HOSPADM

## 2022-01-19 RX ORDER — OXYCODONE AND ACETAMINOPHEN 10; 325 MG/1; MG/1
1 TABLET ORAL EVERY 6 HOURS PRN
Status: DISCONTINUED | OUTPATIENT
Start: 2022-01-19 | End: 2022-01-21 | Stop reason: HOSPADM

## 2022-01-19 RX ADMIN — AMIODARONE HYDROCHLORIDE 75 MG: 1.5 INJECTION, SOLUTION INTRAVENOUS at 22:10

## 2022-01-19 RX ADMIN — SODIUM CHLORIDE, PRESERVATIVE FREE 10 ML: 5 INJECTION INTRAVENOUS at 12:11

## 2022-01-19 RX ADMIN — AMIODARONE HYDROCHLORIDE 150 MG: 1.5 INJECTION, SOLUTION INTRAVENOUS at 12:12

## 2022-01-19 RX ADMIN — SODIUM CHLORIDE, PRESERVATIVE FREE 10 ML: 5 INJECTION INTRAVENOUS at 22:16

## 2022-01-19 RX ADMIN — OXYCODONE AND ACETAMINOPHEN 1 TABLET: 325; 10 TABLET ORAL at 14:44

## 2022-01-19 RX ADMIN — POTASSIUM CHLORIDE 10 MEQ: 750 TABLET, EXTENDED RELEASE ORAL at 12:11

## 2022-01-19 RX ADMIN — METOPROLOL SUCCINATE 75 MG: 25 TABLET, EXTENDED RELEASE ORAL at 12:11

## 2022-01-19 RX ADMIN — APIXABAN 5 MG: 5 TABLET, FILM COATED ORAL at 22:10

## 2022-01-19 RX ADMIN — OXYCODONE AND ACETAMINOPHEN 1 TABLET: 325; 10 TABLET ORAL at 22:10

## 2022-01-19 RX ADMIN — APIXABAN 5 MG: 5 TABLET, FILM COATED ORAL at 12:11

## 2022-01-19 RX ADMIN — AMIODARONE HYDROCHLORIDE 1 MG/MIN: 1.8 INJECTION, SOLUTION INTRAVENOUS at 12:36

## 2022-01-19 RX ADMIN — FUROSEMIDE 20 MG: 20 TABLET ORAL at 12:11

## 2022-01-19 RX ADMIN — AMIODARONE HYDROCHLORIDE 0.5 MG/MIN: 1.8 INJECTION, SOLUTION INTRAVENOUS at 17:51

## 2022-01-19 NOTE — CONSULTS
"Internal medicine consult    Referring physician  Dr. BOWEN    Chief complaint  Atrial fibrillation  Admit for cardioversion    Reason for consult   Follow medical problems    History of present illness  71-year-old female with history of atrial fibrillation hypertension hyperlipidemia low back pain admitted to cardiology service from their office for cardioversion in a.m.  Patient complained of shortness of breath but denies any chest pain palpitation.  Patient also denies any fever cough congestion night sweats weight loss or weight gain.  I am asked to follow the patient for medical problem.    Past Medical History:             Diagnosis Date   • Arrhythmia       atrial fib   • Atrial fibrillation (HCC)     • Chronic pain     • Hyperlipidemia     • Hypertension     • Lumbar herniated disc     • Neuropathy     • Spinal stenosis           Past Surgical History:              Procedure Laterality Date   • CHOLECYSTECTOMY       • SUBTOTAL HYSTERECTOMY                 Social History:               Tobacco Use   • Smoking status: Former Smoker       Quit date:        Years since quittin.0   • Smokeless tobacco: Never Used   Substance Use Topics   • Alcohol use: Not Currently      Family History:           Problem Relation Age of Onset   • Heart disease Mother     • Diabetes Father     • Hypertension Neg Hx     • Hyperlipidemia Neg Hx     • Heart failure Neg Hx     • Heart attack Neg Hx        Allergies:  No Known Allergies  Home Meds: Reviewed    Review of Systems:  As above    Physical examination  Blood pressure 128/87, pulse 109, temperature 98.1 °F (36.7 °C), temperature source Oral, resp. rate 16, height 172.7 cm (68\"), weight 107 kg (236 lb 3.2 oz), SpO2 97 %.    General:  Appears in no acute distress  Eyes: PERTL,  HEENT:  No JVD. Thyroid not visibly enlarged. No mucosal pallor or cyanosis  Respiratory:  Normal effort moving air bilaterally no crackles, rubs or wheezes auscultated  Cardiovascular: Irregular " S1S2 No murmur, rub or gallop auscultated.  Gastrointestinal: Abdomen soft, flat, non tender. Bowel sounds present.   Musculoskeletal: SEYMOUR x4. No abnormal movements  Extremities: No digital clubbing or cyanosis  Skin: Color pink. Skin warm and dry to touch. No rashes  No xanthoma  Neuro: AAO x3 CN II-XII grossly intact    LABS  Lab Results (last 24 hours)     Procedure Component Value Units Date/Time    COVID PRE-OP / PRE-PROCEDURE SCREENING ORDER (NO ISOLATION) - Swab, Nasopharynx [[id number]]  (Normal) Collected: 01/19/22 1111    Specimen: Swab from Nasopharynx Updated: 01/19/22 1518    Narrative:      The following orders were created for panel order COVID PRE-OP / PRE-PROCEDURE SCREENING ORDER (NO ISOLATION) - Swab, Nasopharynx.  Procedure                               Abnormality         Status                     ---------                               -----------         ------                     COVID-19,APTIMA PANTHER(...[[id number]]  Normal              Final result                 Please view results for these tests on the individual orders.    COVID-19,APTIMA PANTHER(NANCY),[facility], NP/OP SWAB IN UTM/VTM/SALINE TRANSPORT MEDIA,24 HR TAT - Swab, Nasopharynx [[id number]]  (Normal) Collected: 01/19/22 1111    Specimen: Swab from Nasopharynx Updated: 01/19/22 1518     COVID19 Not Detected    Narrative:      Fact sheet for providers: https://www.fda.gov/media/537339/download     Fact sheet for patients: https://www.fda.gov/media/461936/download    Test performed by RT PCR.    Troponin [[id number]]  (Normal) Collected: 01/19/22 1153    Specimen: Blood Updated: 01/19/22 1318     Troponin T <0.010 ng/mL     Narrative:      Troponin T Reference Range:  <= 0.03 ng/mL-   Negative for AMI  >0.03 ng/mL-     Abnormal for myocardial necrosis.  Clinicians would have to utilize clinical acumen, EKG, Troponin and serial changes to determine if it is an Acute Myocardial Infarction or myocardial injury due to an underlying  chronic condition.       Results may be falsely decreased if patient taking Biotin.      TSH [[id number]]  (Normal) Collected: 01/19/22 1153    Specimen: Blood Updated: 01/19/22 1252     TSH 0.838 uIU/mL     BNP [[id number]]  (Abnormal) Collected: 01/19/22 1153    Specimen: Blood Updated: 01/19/22 1252     proBNP 3,498.0 pg/mL     Narrative:      Among patients with dyspnea, NT-proBNP is highly sensitive for the detection of acute congestive heart failure. In addition NT-proBNP of <300 pg/ml effectively rules out acute congestive heart failure with 99% negative predictive value.    Results may be falsely decreased if patient taking Biotin.      Comprehensive Metabolic Panel [[id number]]  (Abnormal) Collected: 01/19/22 1153    Specimen: Blood Updated: 01/19/22 1250     Glucose 131 mg/dL      BUN 15 mg/dL      Creatinine 0.98 mg/dL      Sodium 142 mmol/L      Potassium 4.0 mmol/L      Chloride 105 mmol/L      CO2 28.0 mmol/L      Calcium 9.4 mg/dL      Total Protein 7.1 g/dL      Albumin 4.20 g/dL      ALT (SGPT) 14 U/L      AST (SGOT) 19 U/L      Alkaline Phosphatase 76 U/L      Total Bilirubin 0.7 mg/dL      eGFR  African Amer 68 mL/min/1.73      Globulin 2.9 gm/dL      A/G Ratio 1.4 g/dL      BUN/Creatinine Ratio 15.3     Anion Gap 9.0 mmol/L     Narrative:      GFR Normal >60  Chronic Kidney Disease <60  Kidney Failure <15      Magnesium [[id number]]  (Normal) Collected: 01/19/22 1153    Specimen: Blood Updated: 01/19/22 1250     Magnesium 1.9 mg/dL     Protime-INR [[id number]]  (Abnormal) Collected: 01/19/22 1153    Specimen: Blood Updated: 01/19/22 1232     Protime 16.4 Seconds      INR 1.34    aPTT [[id number]]  (Normal) Collected: 01/19/22 1153    Specimen: Blood Updated: 01/19/22 1232     PTT 32.6 seconds     CBC Auto Differential [[id number]]  (Abnormal) Collected: 01/19/22 1153    Specimen: Blood Updated: 01/19/22 1222     WBC 5.14 10*3/mm3      RBC 4.82 10*6/mm3      Hemoglobin 12.6 g/dL      Hematocrit 41.9 %       MCV 86.9 fL      MCH 26.1 pg      MCHC 30.1 g/dL      RDW 13.1 %      RDW-SD 41.4 fl      MPV 11.7 fL      Platelets 212 10*3/mm3      Neutrophil % 66.0 %      Lymphocyte % 23.7 %      Monocyte % 6.6 %      Eosinophil % 2.7 %      Basophil % 0.8 %      Immature Grans % 0.2 %      Neutrophils, Absolute 3.39 10*3/mm3      Lymphocytes, Absolute 1.22 10*3/mm3      Monocytes, Absolute 0.34 10*3/mm3      Eosinophils, Absolute 0.14 10*3/mm3      Basophils, Absolute 0.04 10*3/mm3      Immature Grans, Absolute 0.01 10*3/mm3      nRBC 0.0 /100 WBC         Imaging Results (Last 24 Hours)     ** No results found for the last 24 hours. **             ECG 12 Lead  Component   Ref Range & Units 1/19/22 1035   QT Interval   ms 318              HEART RATE= 133  bpm  RR Interval= 452  ms  OK Interval=   ms  P Horizontal Axis=   deg  P Front Axis=   deg  QRSD Interval= 74  ms  QT Interval= 318  ms  QRS Axis= 79  deg  T Wave Axis= 65  deg  - ABNORMAL ECG -  Atrial fibrillation  NO PRIOR TRACING AVAILABLE FOR COMPARISON             Current Facility-Administered Medications:   •  albuterol (PROVENTIL) nebulizer solution 0.083% 2.5 mg/3mL, 2.5 mg, Nebulization, Q6H PRN, Radha Lake APRN  •  [COMPLETED] amiodarone in dextrose 5% (NEXTERONE) loading dose 150mg/100mL, 150 mg, Intravenous, Once, Stopped at 01/19/22 1222 **FOLLOWED BY** amiodarone 360 mg in 200 mL D5W infusion, 1 mg/min, Intravenous, Continuous, Last Rate: 33.3 mL/hr at 01/19/22 1442, 1 mg/min at 01/19/22 1442 **FOLLOWED BY** amiodarone 360 mg in 200 mL D5W infusion, 0.5 mg/min, Intravenous, Continuous, Radha Lake, APRN  •  amiodarone in dextrose 5% (NEXTERONE) loading dose 150mg/100mL, 75 mg, Intravenous, Once, Radha Lake APRN  •  apixaban (ELIQUIS) tablet 5 mg, 5 mg, Oral, BID, Peevey, Radha L, APRN, 5 mg at 01/19/22 1211  •  furosemide (LASIX) tablet 20 mg, 20 mg, Oral, Daily, Radha Lake APRN, 20 mg at 01/19/22 1211  •  metoprolol succinate XL  (TOPROL-XL) 24 hr tablet 75 mg, 75 mg, Oral, Daily, Radha Lake, APRN, 75 mg at 01/19/22 1211  •  nitroglycerin (NITROSTAT) SL tablet 0.4 mg, 0.4 mg, Sublingual, Q5 Min PRN, Radha Lake, APRN  •  oxyCODONE-acetaminophen (PERCOCET)  MG per tablet 1 tablet, 1 tablet, Oral, Q6H PRN, Racheal Wilson MD, 1 tablet at 01/19/22 1444  •  potassium chloride (K-DUR,KLOR-CON) ER tablet 10 mEq, 10 mEq, Oral, Daily, Radha Lake, APRN, 10 mEq at 01/19/22 1211  •  sodium chloride 0.9 % flush 10 mL, 10 mL, Intravenous, Q12H, Jordan Lakea L, APRN, 10 mL at 01/19/22 1211  •  sodium chloride 0.9 % flush 10 mL, 10 mL, Intravenous, PRN, Radha Lake L, APRN     ASSESSMENT  Chronic atrial fibrillation admit for cardioversion  Hypertension  Hyperlipidemia  Diastolic congestive heart failure  Chronic low back pain  Gastroesophageal reflux disease    PLAN  Agree with current care  Amiodarone  Anticoagulation  Cardioversion in a.m.  Continue home medications  Stress ulcer DVT prophylaxis  Check chest x-ray  Repeat labs in a.m.  Check hemoglobin A1c TSH lipid profile  Supportive care  Patient is full code  Discussed with nursing staff  Will follow with Dr. BOWEN and further recommendation according to hospital course    RHODA LEWIS MD

## 2022-01-19 NOTE — H&P
[facility]  History & Physical Note                                                                                    Patient Identification:  Denia Merritt:   71 y.o.  female  1950  [id number]            No chief complaint on file.      Date of Admission:2022    Admitting Physician: Racheal Wilson    Reason for Admission:CARIO VERSION, No chief complaint on file.      History of Present Illness:     This is a 71-year-old female who was known to our service. She has a medical history to include chronic pain, hyperlipidemia, CKD stage III, hypertension, herniated disc, neuropathy and spinal stenosis. She has been admitted for A. fib with RVR. She was seen in office on 2021 by Nazia HARDY. She declined admission at that time. She followed up with Dr. Wilson on 2022 she was still in A. fib with rate in the 140s. At that time she was agreeable to admission. She has been admitted to initiate amiodarone therapy with a planned cardioversion tomorrow. She has been anticoagulated on Eliquis and reports that she has not missed any doses.    Echo 2021 EF 45 to 50%, indeterminate LV diastolic function, full report as below. Stress test 2021 myocardial perfusion imaging indicated a normal study with no evidence of ischemia.    Cardiac Risk Factors: Hypertension, CKD, age, hyperlipidemia    Past Medical History:  Past Medical History:   Diagnosis Date    Arrhythmia     atrial fib    Atrial fibrillation (HCC)     Chronic pain     Hyperlipidemia     Hypertension     Lumbar herniated disc     Neuropathy     Spinal stenosis     at least 3 stable    Past Surgical History:  Past Surgical History:   Procedure Laterality Date    CHOLECYSTECTOMY      SUBTOTAL HYSTERECTOMY      partial, kept ovaries        Social History:   Social History     Tobacco Use    Smoking status: Former Smoker     Quit date:      Years since quittin.0    Smokeless tobacco: Never Used    Substance Use Topics    Alcohol use: Not Currently        Family History:  Family History   Problem Relation Age of Onset    Heart disease Mother     Diabetes Father     Hypertension Neg Hx     Hyperlipidemia Neg Hx     Heart failure Neg Hx     Heart attack Neg Hx           Allergies:  No Known Allergies    Home Meds:  No current facility-administered medications on file prior to encounter.     Current Outpatient Medications on File Prior to Encounter   Medication Sig Dispense Refill    albuterol sulfate  (90 Base) MCG/ACT inhaler Inhale 2 puffs Every 4 (Four) Hours As Needed for Wheezing.      cetirizine (zyrTEC) 10 MG tablet Take 10 mg by mouth As Needed.      cholecalciferol (VITAMIN D3) 10 MCG (400 UNIT) tablet Take 400 Units by mouth Daily.      diazePAM (VALIUM) 5 MG tablet Take 5 mg by mouth 2 (Two) Times a Day.      Eliquis 5 MG tablet tablet Take 5 mg by mouth 2 (Two) Times a Day.      furosemide (LASIX) 20 MG tablet Take 20 mg by mouth Daily.      metoprolol succinate XL (TOPROL-XL) 50 MG 24 hr tablet Take 1.5 tablets by mouth Daily. 30 tablet 11    oxyCODONE-acetaminophen (PERCOCET)  MG per tablet Take 1 tablet by mouth Every 6 (Six) Hours As Needed.      potassium chloride 10 MEQ CR tablet Take 10 mEq by mouth Daily.      vitamin B-12 (CYANOCOBALAMIN) 1000 MCG tablet Take 1,000 mcg by mouth Daily.      vitamin C (ASCORBIC ACID) 250 MG tablet Take 250 mg by mouth Daily.      Calcet Petites 200-250 MG-UNIT tablet TAKE TWO TABLETS BY MOUTH EVERY DAY      tiZANidine (ZANAFLEX) 4 MG tablet TK 1 T PO TID PRN      [DISCONTINUED] vitamin D (ERGOCALCIFEROL) 1.25 MG (93296 UT) capsule capsule TK 1 C PO Q WK           Scheduled Meds:  apixaban, 5 mg, BID  furosemide, 20 mg, Daily  metoprolol succinate XL, 75 mg, Daily  potassium chloride, 10 mEq, Daily  sodium chloride, 10 mL, Q12H            INTAKE AND OUTPUT:    Intake/Output Summary (Last 24 hours) at 1/19/2022 1501  Last data filed at 1/19/2022  "1130  Gross per 24 hour   Intake 120 ml   Output --   Net 120 ml           Review of Systems:  Review of Systems  Constitutional: No wt loss, fever   Gastrointestinal: No nausea , abdominal pain  Behavioral/Psych: No insomnia or anxiety   Cardiovascular ----positive for PALPITATION. All other systems reviewed and are negative          Constitutional:  Temp:  [98.1 °F (36.7 °C)] 98.1 °F (36.7 °C)  Heart Rate:  [125] 125  Resp:  [16] 16  BP: (153)/(93) 153/93    Physical Exam:           Physical Exam  /87 (BP Location: Left arm, Patient Position: Sitting)   Pulse 109   Temp 98.1 °F (36.7 °C) (Oral)   Resp 16   Ht 172.7 cm (68\")   Wt 107 kg (236 lb 3.2 oz)   SpO2 97%   BMI 35.91 kg/m²     General appearance: No acute changes   Eyes: Sclerae conjunctivae normal, pupils reactive   HENT: Atraumatic; oropharynx clear with moist mucous membranes and no mucosal ulcerations;  Neck: Trachea midline; NECK, supple, no thyromegaly or lymphadenopathy   Lungs: Normal size and shape, normal breath sounds, equal distribution of air, no rales and rhonchi   CV: S1-S2 regular, no murmurs, no rub, no gallop   Abdomen: Soft, nontender; no masses , no abnormal abdominal sounds   Extremities: No deformity , normal color , no peripheral edema   Skin: Normal temperature, turgor and texture; no rash, ulcers  Psych: Appropriate affect, alert and oriented to person, place and time                                Cardiographics    ECG:     Comparison ecg 1/13/22      Telemetry:      ECHO:    Interpretation Summary    Estimated right ventricular systolic pressure from tricuspid regurgitation is normal (<35 mmHg).  The left ventricular cavity is borderline dilated.  Estimated left ventricular EF was in agreement with the calculated left ventricular EF. Left ventricular ejection fraction appears to be 46 - 50%.  Left ventricular diastolic function was indeterminate.  Saline test neg for PFO    Interpretation Summary       Findings " consistent with a normal ECG stress test.  Left ventricular ejection fraction is normal. (Calculated EF = 57%).  Myocardial perfusion imaging indicates a normal myocardial perfusion study with no evidence of ischemia.  Impressions are consistent with a low risk study.      Lab Review:  Results from last 7 days   Lab Units 01/19/22  1153   TROPONIN T ng/mL <0.010     Results from last 7 days   Lab Units 01/19/22  1153   MAGNESIUM mg/dL 1.9     Results from last 7 days   Lab Units 01/19/22  1153   SODIUM mmol/L 142   POTASSIUM mmol/L 4.0   BUN mg/dL 15   CREATININE mg/dL 0.98   CALCIUM mg/dL 9.4     @LABRCNTbnp@  Results from last 7 days   Lab Units 01/19/22  1153   WBC 10*3/mm3 5.14   HEMOGLOBIN g/dL 12.6   HEMATOCRIT % 41.9   PLATELETS 10*3/mm3 212     Results from last 7 days   Lab Units 01/19/22  1153   INR  1.34*   APTT seconds 32.6         CXR:    CHADS-VASc Risk Assessment              3 Total Score    1 Hypertension    1 Age 65-74    1 Sex: Female        Criteria that do not apply:    CHF    Age >/= 75    DM    PRIOR STROKE/TIA/THROMBO    Vascular Disease             The following medical decision was discussed in detail with Dr. Wilson    Assessment / Plan:    Atrial fibrillation  Hypertension      Recommendations:    This is a 71-year-old female who is known to our service. She has been admitted for A. fib with RVR. Amiodarone IV per protocol. We will plan for cardioversion tomorrow. She has been anticoagulated on Eliquis and reports that she has not missed any doses. She will receive an additional bolus of IV amiodarone 75 mg tonight.      Patient agreed to cardioversion, procedure risks/benefits(allergy, arrhythmia, skin trauma) and options of the cardioversion have been explained to the patient/family/PO.A.  He/she/all/they voiced understanding and agreement with treatment plan        Labs/tests ordered in am: consult IMN.p.o. after midnight, cardioversion, CBC, BMP, lipid panel, troponin,  "ECG.    Racheal Wilson MD    1/19/2022  15:01 EST    EMR Dragon/Transcription:   \"Dictated utilizing Dragon dictation\".     "

## 2022-01-20 LAB
ALBUMIN SERPL-MCNC: 3.5 G/DL (ref 3.5–5.2)
ALBUMIN/GLOB SERPL: 1.3 G/DL
ALP SERPL-CCNC: 64 U/L (ref 39–117)
ALT SERPL W P-5'-P-CCNC: 11 U/L (ref 1–33)
ANION GAP SERPL CALCULATED.3IONS-SCNC: 14 MMOL/L (ref 5–15)
AST SERPL-CCNC: 18 U/L (ref 1–32)
BASOPHILS # BLD AUTO: 0.05 10*3/MM3 (ref 0–0.2)
BASOPHILS NFR BLD AUTO: 1.1 % (ref 0–1.5)
BILIRUB SERPL-MCNC: 0.4 MG/DL (ref 0–1.2)
BUN SERPL-MCNC: 17 MG/DL (ref 8–23)
BUN/CREAT SERPL: 20.2 (ref 7–25)
CALCIUM SPEC-SCNC: 8.6 MG/DL (ref 8.6–10.5)
CHLORIDE SERPL-SCNC: 105 MMOL/L (ref 98–107)
CHOLEST SERPL-MCNC: 123 MG/DL (ref 0–200)
CO2 SERPL-SCNC: 20 MMOL/L (ref 22–29)
CREAT SERPL-MCNC: 0.84 MG/DL (ref 0.57–1)
DEPRECATED RDW RBC AUTO: 42.4 FL (ref 37–54)
EOSINOPHIL # BLD AUTO: 0.18 10*3/MM3 (ref 0–0.4)
EOSINOPHIL NFR BLD AUTO: 3.9 % (ref 0.3–6.2)
ERYTHROCYTE [DISTWIDTH] IN BLOOD BY AUTOMATED COUNT: 13.5 % (ref 12.3–15.4)
GFR SERPL CREATININE-BSD FRML MDRD: 81 ML/MIN/1.73
GLOBULIN UR ELPH-MCNC: 2.7 GM/DL
GLUCOSE SERPL-MCNC: 81 MG/DL (ref 65–99)
HBA1C MFR BLD: 6.09 % (ref 4.8–5.6)
HCT VFR BLD AUTO: 38.7 % (ref 34–46.6)
HDLC SERPL-MCNC: 34 MG/DL (ref 40–60)
HGB BLD-MCNC: 11.7 G/DL (ref 12–15.9)
LDLC SERPL CALC-MCNC: 74 MG/DL (ref 0–100)
LDLC/HDLC SERPL: 2.19 {RATIO}
LYMPHOCYTES # BLD AUTO: 1.5 10*3/MM3 (ref 0.7–3.1)
LYMPHOCYTES NFR BLD AUTO: 32.4 % (ref 19.6–45.3)
MCH RBC QN AUTO: 26.2 PG (ref 26.6–33)
MCHC RBC AUTO-ENTMCNC: 30.2 G/DL (ref 31.5–35.7)
MCV RBC AUTO: 86.8 FL (ref 79–97)
MONOCYTES # BLD AUTO: 0.48 10*3/MM3 (ref 0.1–0.9)
MONOCYTES NFR BLD AUTO: 10.4 % (ref 5–12)
NEUTROPHILS NFR BLD AUTO: 2.38 10*3/MM3 (ref 1.7–7)
NEUTROPHILS NFR BLD AUTO: 51.3 % (ref 42.7–76)
PLATELET # BLD AUTO: 153 10*3/MM3 (ref 140–450)
PMV BLD AUTO: 13 FL (ref 6–12)
POTASSIUM SERPL-SCNC: 4.1 MMOL/L (ref 3.5–5.2)
PROT SERPL-MCNC: 6.2 G/DL (ref 6–8.5)
QT INTERVAL: 411 MS
RBC # BLD AUTO: 4.46 10*6/MM3 (ref 3.77–5.28)
SODIUM SERPL-SCNC: 139 MMOL/L (ref 136–145)
TRIGL SERPL-MCNC: 73 MG/DL (ref 0–150)
TROPONIN T SERPL-MCNC: <0.01 NG/ML (ref 0–0.03)
TSH SERPL DL<=0.05 MIU/L-ACNC: 2.34 UIU/ML (ref 0.27–4.2)
VLDLC SERPL-MCNC: 15 MG/DL (ref 5–40)
WBC NRBC COR # BLD: 4.63 10*3/MM3 (ref 3.4–10.8)

## 2022-01-20 PROCEDURE — 83036 HEMOGLOBIN GLYCOSYLATED A1C: CPT | Performed by: HOSPITALIST

## 2022-01-20 PROCEDURE — 84484 ASSAY OF TROPONIN QUANT: CPT

## 2022-01-20 PROCEDURE — 96366 THER/PROPH/DIAG IV INF ADDON: CPT

## 2022-01-20 PROCEDURE — 93010 ELECTROCARDIOGRAM REPORT: CPT | Performed by: INTERNAL MEDICINE

## 2022-01-20 PROCEDURE — 93005 ELECTROCARDIOGRAM TRACING: CPT | Performed by: INTERNAL MEDICINE

## 2022-01-20 PROCEDURE — 80061 LIPID PANEL: CPT

## 2022-01-20 PROCEDURE — G0378 HOSPITAL OBSERVATION PER HR: HCPCS

## 2022-01-20 PROCEDURE — 93005 ELECTROCARDIOGRAM TRACING: CPT

## 2022-01-20 PROCEDURE — 80050 GENERAL HEALTH PANEL: CPT | Performed by: HOSPITALIST

## 2022-01-20 PROCEDURE — 25010000002 AMIODARONE IN DEXTROSE 5% 360-4.14 MG/200ML-% SOLUTION

## 2022-01-20 RX ORDER — AMIODARONE HYDROCHLORIDE 200 MG/1
200 TABLET ORAL
Status: DISCONTINUED | OUTPATIENT
Start: 2022-01-20 | End: 2022-01-21 | Stop reason: HOSPADM

## 2022-01-20 RX ADMIN — APIXABAN 5 MG: 5 TABLET, FILM COATED ORAL at 08:56

## 2022-01-20 RX ADMIN — SODIUM CHLORIDE, PRESERVATIVE FREE 10 ML: 5 INJECTION INTRAVENOUS at 21:33

## 2022-01-20 RX ADMIN — AMIODARONE HYDROCHLORIDE 0.5 MG/MIN: 1.8 INJECTION, SOLUTION INTRAVENOUS at 06:51

## 2022-01-20 RX ADMIN — AMIODARONE HYDROCHLORIDE 200 MG: 200 TABLET ORAL at 09:12

## 2022-01-20 RX ADMIN — SODIUM CHLORIDE, PRESERVATIVE FREE 10 ML: 5 INJECTION INTRAVENOUS at 09:19

## 2022-01-20 RX ADMIN — PANTOPRAZOLE SODIUM 40 MG: 40 TABLET, DELAYED RELEASE ORAL at 06:51

## 2022-01-20 RX ADMIN — FUROSEMIDE 20 MG: 20 TABLET ORAL at 08:56

## 2022-01-20 RX ADMIN — OXYCODONE AND ACETAMINOPHEN 1 TABLET: 325; 10 TABLET ORAL at 18:21

## 2022-01-20 RX ADMIN — APIXABAN 5 MG: 5 TABLET, FILM COATED ORAL at 21:33

## 2022-01-20 RX ADMIN — POTASSIUM CHLORIDE 10 MEQ: 750 TABLET, EXTENDED RELEASE ORAL at 08:56

## 2022-01-20 NOTE — PROGRESS NOTES
"Daily progress note    Chief complaint  Status post cardioversion  Doing same  No new complaints  Denies chest pain shortness of breath palpitation    History of present illness  71-year-old female with history of atrial fibrillation hypertension hyperlipidemia low back pain admitted to cardiology service from their office for cardioversion in a.m.  Patient complained of shortness of breath but denies any chest pain palpitation.  Patient also denies any fever cough congestion night sweats weight loss or weight gain.  I am asked to follow the patient for medical problem.    Review of Systems:  As above    Physical examination  Blood pressure 123/78, pulse (!) 42, temperature 97.6 °F (36.4 °C), temperature source Oral, resp. rate 16, height 172.7 cm (68\"), weight 107 kg (236 lb 3.2 oz), SpO2 95 %.    General:  Appears in no acute distress  Eyes: PERTL,  HEENT:  No JVD. Thyroid not visibly enlarged. No mucosal pallor or cyanosis  Respiratory:  Normal effort moving air bilaterally no crackles, rubs or wheezes auscultated  Cardiovascular: Irregular S1S2 No murmur, rub or gallop auscultated.  Gastrointestinal: Abdomen soft, flat, non tender. Bowel sounds present.   Musculoskeletal: SEYMOUR x4. No abnormal movements  Extremities: No digital clubbing or cyanosis  Skin: Color pink. Skin warm and dry to touch. No rashes  No xanthoma  Neuro: AAO x3 CN II-XII grossly intact    LABS  Lab Results (last 24 hours)     Procedure Component Value Units Date/Time    Troponin [[id number]]  (Normal) Collected: 01/20/22 1109    Specimen: Blood Updated: 01/20/22 1310     Troponin T <0.010 ng/mL     Narrative:      Troponin T Reference Range:  <= 0.03 ng/mL-   Negative for AMI  >0.03 ng/mL-     Abnormal for myocardial necrosis.  Clinicians would have to utilize clinical acumen, EKG, Troponin and serial changes to determine if it is an Acute Myocardial Infarction or myocardial injury due to an underlying chronic condition.       Results may be " falsely decreased if patient taking Biotin.      Hemoglobin A1c [[id number]]  (Abnormal) Collected: 01/20/22 0458    Specimen: Blood Updated: 01/20/22 0623     Hemoglobin A1C 6.09 %     Narrative:      Hemoglobin A1C Ranges:    Increased Risk for Diabetes  5.7% to 6.4%  Diabetes                     >= 6.5%  Diabetic Goal                < 7.0%    TSH [[id number]]  (Normal) Collected: 01/20/22 0458    Specimen: Blood Updated: 01/20/22 0618     TSH 2.340 uIU/mL     Comprehensive Metabolic Panel [[id number]]  (Abnormal) Collected: 01/20/22 0458    Specimen: Blood Updated: 01/20/22 0611     Glucose 81 mg/dL      BUN 17 mg/dL      Creatinine 0.84 mg/dL      Sodium 139 mmol/L      Potassium 4.1 mmol/L      Comment: Slight hemolysis detected by analyzer. Results may be affected.        Chloride 105 mmol/L      CO2 20.0 mmol/L      Calcium 8.6 mg/dL      Total Protein 6.2 g/dL      Albumin 3.50 g/dL      ALT (SGPT) 11 U/L      AST (SGOT) 18 U/L      Alkaline Phosphatase 64 U/L      Total Bilirubin 0.4 mg/dL      eGFR   Amer 81 mL/min/1.73      Globulin 2.7 gm/dL      A/G Ratio 1.3 g/dL      BUN/Creatinine Ratio 20.2     Anion Gap 14.0 mmol/L     Narrative:      GFR Normal >60  Chronic Kidney Disease <60  Kidney Failure <15      Lipid Panel [[id number]]  (Abnormal) Collected: 01/20/22 0458    Specimen: Blood Updated: 01/20/22 0611     Total Cholesterol 123 mg/dL      Triglycerides 73 mg/dL      HDL Cholesterol 34 mg/dL      LDL Cholesterol  74 mg/dL      VLDL Cholesterol 15 mg/dL      LDL/HDL Ratio 2.19    Narrative:      Cholesterol Reference Ranges  (U.S. Department of Health and Human Services ATP III Classifications)    Desirable          <200 mg/dL  Borderline High    200-239 mg/dL  High Risk          >240 mg/dL      Triglyceride Reference Ranges  (U.S. Department of Health and Human Services ATP III Classifications)    Normal           <150 mg/dL  Borderline High  150-199 mg/dL  High             200-499 mg/dL  Very  High        >500 mg/dL    HDL Reference Ranges  (U.S. Department of Health and Human Services ATP III Classifcations)    Low     <40 mg/dl (major risk factor for CHD)  High    >60 mg/dl ('negative' risk factor for CHD)        LDL Reference Ranges  (U.S. Department of Health and Human Services ATP III Classifcations)    Optimal          <100 mg/dL  Near Optimal     100-129 mg/dL  Borderline High  130-159 mg/dL  High             160-189 mg/dL  Very High        >189 mg/dL    CBC & Differential [[id number]]  (Abnormal) Collected: 01/20/22 0458    Specimen: Blood Updated: 01/20/22 0610    Narrative:      The following orders were created for panel order CBC & Differential.  Procedure                               Abnormality         Status                     ---------                               -----------         ------                     CBC Auto Differential[[id number]]        Abnormal            Final result                 Please view results for these tests on the individual orders.    CBC Auto Differential [[id number]]  (Abnormal) Collected: 01/20/22 0458    Specimen: Blood Updated: 01/20/22 0610     WBC 4.63 10*3/mm3      RBC 4.46 10*6/mm3      Hemoglobin 11.7 g/dL      Hematocrit 38.7 %      MCV 86.8 fL      MCH 26.2 pg      MCHC 30.2 g/dL      RDW 13.5 %      RDW-SD 42.4 fl      MPV 13.0 fL      Platelets 153 10*3/mm3      Comment: Platelet clumping noted. Platelet count may be falsely decreased due to platelet clumping.         Neutrophil % 51.3 %      Lymphocyte % 32.4 %      Monocyte % 10.4 %      Eosinophil % 3.9 %      Basophil % 1.1 %      Neutrophils, Absolute 2.38 10*3/mm3      Lymphocytes, Absolute 1.50 10*3/mm3      Monocytes, Absolute 0.48 10*3/mm3      Eosinophils, Absolute 0.18 10*3/mm3      Basophils, Absolute 0.05 10*3/mm3     COVID PRE-OP / PRE-PROCEDURE SCREENING ORDER (NO ISOLATION) - Swab, Nasopharynx [[id number]]  (Normal) Collected: 01/19/22 1111    Specimen: Swab from Nasopharynx Updated:  01/19/22 1518    Narrative:      The following orders were created for panel order COVID PRE-OP / PRE-PROCEDURE SCREENING ORDER (NO ISOLATION) - Swab, Nasopharynx.  Procedure                               Abnormality         Status                     ---------                               -----------         ------                     COVID-19,APTIMA PANTHER(...[[id number]]  Normal              Final result                 Please view results for these tests on the individual orders.    COVID-19,APTIMA PANTHER(NANCY),[facility]/ ELOISE, NP/OP SWAB IN UTM/VTM/SALINE TRANSPORT MEDIA,24 HR TAT - Swab, Nasopharynx [[id number]]  (Normal) Collected: 01/19/22 1111    Specimen: Swab from Nasopharynx Updated: 01/19/22 1518     COVID19 Not Detected    Narrative:      Fact sheet for providers: https://www.fda.gov/media/708570/download     Fact sheet for patients: https://www.fda.gov/media/825194/download    Test performed by RT PCR.        Imaging Results (Last 24 Hours)     Procedure Component Value Units Date/Time    XR Chest 1 View [[id number]] Collected: 01/19/22 1810     Updated: 01/19/22 1814    Narrative:      AP CHEST     HISTORY: Shortness of breath     COMPARISON: None available     FINDINGS: There is a small right pleural effusion with some fluid in the  minor fissure. There is some right basilar atelectasis or consolidation.  Cardiomegaly. No acute bony abnormality.       Impression:      Small right pleural effusion with right basilar atelectasis or  consolidation     This report was finalized on 1/19/2022 6:10 PM by Dr. Quan Aj M.D.                ECG 12 Lead  Component   Ref Range & Units 1/19/22 1035   QT Interval   ms 318              HEART RATE= 133  bpm  RR Interval= 452  ms  TX Interval=   ms  P Horizontal Axis=   deg  P Front Axis=   deg  QRSD Interval= 74  ms  QT Interval= 318  ms  QRS Axis= 79  deg  T Wave Axis= 65  deg  - ABNORMAL ECG -  Atrial fibrillation  NO PRIOR TRACING AVAILABLE FOR COMPARISON              Current Facility-Administered Medications:   •  albuterol (PROVENTIL) nebulizer solution 0.083% 2.5 mg/3mL, 2.5 mg, Nebulization, Q6H PRN, Radha Lake APRN  •  amiodarone (PACERONE) tablet 200 mg, 200 mg, Oral, Q24H, Nazia Baird APRN, 200 mg at 01/20/22 0912  •  apixaban (ELIQUIS) tablet 5 mg, 5 mg, Oral, BID, Radha Lake APRN, 5 mg at 01/20/22 0856  •  furosemide (LASIX) tablet 20 mg, 20 mg, Oral, Daily, Radha Lake APRN, 20 mg at 01/20/22 0856  •  metoprolol succinate XL (TOPROL-XL) 24 hr tablet 75 mg, 75 mg, Oral, Daily, Nazia Baird APRN, 75 mg at 01/19/22 1211  •  nitroglycerin (NITROSTAT) SL tablet 0.4 mg, 0.4 mg, Sublingual, Q5 Min PRN, Radha Lake APRN  •  oxyCODONE-acetaminophen (PERCOCET)  MG per tablet 1 tablet, 1 tablet, Oral, Q6H PRN, Racheal Wilson MD, 1 tablet at 01/19/22 2210  •  pantoprazole (PROTONIX) EC tablet 40 mg, 40 mg, Oral, Q AM, Rhoda Umana MD, 40 mg at 01/20/22 0651  •  potassium chloride (K-DUR,KLOR-CON) ER tablet 10 mEq, 10 mEq, Oral, Daily, Radha Lake APRN, 10 mEq at 01/20/22 0856  •  sodium chloride 0.9 % flush 10 mL, 10 mL, Intravenous, Q12H, Radha Lake APRN, 10 mL at 01/20/22 0919  •  sodium chloride 0.9 % flush 10 mL, 10 mL, Intravenous, PRN, Radha Lake, APRN     ASSESSMENT  Chronic atrial fibrillation admit status post cardioversion  Hypertension  Hyperlipidemia  Diastolic congestive heart failure  Chronic low back pain  Gastroesophageal reflux disease    PLAN  CPM  Post cardioversion care  Monitor  Amiodarone  Anticoagulation  Continue home medications  Stress ulcer DVT prophylaxis  Supportive care  Activity as tolerated  Discussed with nursing staff  Will follow with Dr. BOWEN and further recommendation according to hospital course    RHODA UMANA MD

## 2022-01-20 NOTE — PAYOR COMM NOTE
"Fang Quezada (71 y.o. Female)                         ATTENTION;   PRECERT REQUEST FOR OBSERVATION SERVICES, TESTING AND TREATMENT                       REPLY TO [facility] DEPT  [phone] OR CALL .                  Date of Birth Social Security Number Address Home Phone MRN    1950  [id number] [facility] [phone] [id number]    Hinduism Marital Status             Uatsdin        Admission Date Admission Type Admitting Provider Attending Provider Department, Room/Bed    1/19/22 Urgent Racheal Wilson MD Chandiramani, Shanker, MD [address], 2203/1    Discharge Date Discharge Disposition Discharge Destination                         Attending Provider: Racheal Wilson MD    Allergies: No Known Allergies    Isolation: None   Infection: None   Code Status: CPR   Advance Care Planning Activity    Ht: 172.7 cm (68\")   Wt: 107 kg (236 lb 3.2 oz)    Admission Cmt: [facility],  [facility]   Principal Problem: None                Active Insurance as of 1/19/2022     Primary Coverage     Payor Plan Insurance Group Employer/Plan Group    [facility]      Payor Plan Address Payor Plan Phone Number Payor Plan Fax Number Effective Dates    [address] [address] [phone]  6/12/2015 - None Entered    [facility] [address]       Subscriber Name Subscriber Birth Date Member ID       FANG QUEZADA 1950 [id number]           Secondary Coverage     Payor Plan Insurance Group Employer/Plan Group    [facility] MEDICARE REPLACEMENT [facility] MED[employer]ARE REPLACEMENT      Payor Plan Address Payor Plan Phone Number Payor Plan Fax Number Effective Dates    [address] [address] [phone]  12/1/2021 - None Entered    [facility] [address]       Subscriber Name Subscriber Birth Date Member ID       FANG QUEZADA 1950 [id number]                 Emergency Contacts      (Rel.) Home Phone Work Phone Mobile Phone    AC MEREDITH " (Sister) [phone] -- --               History & Physical      Racheal Wilson MD at 01/19/22 1500          [facility]  History & Physical Note                                                                                    Patient Identification:  Denia Merritt:   71 y.o.  female  1950  [id number]            No chief complaint on file.      Date of Admission:1/19/2022    Admitting Physician: Racheal Wilson    Reason for Admission:CARIO VERSION, No chief complaint on file.      History of Present Illness:     This is a 71-year-old female who was known to our service. She has a medical history to include chronic pain, hyperlipidemia, CKD stage III, hypertension, herniated disc, neuropathy and spinal stenosis. She has been admitted for A. fib with RVR. She was seen in office on 12/28/2021 by Nazia HARDY. She declined admission at that time. She followed up with Dr. Wilson on 1/13/2022 she was still in A. fib with rate in the 140s. At that time she was agreeable to admission. She has been admitted to initiate amiodarone therapy with a planned cardioversion tomorrow. She has been anticoagulated on Eliquis and reports that she has not missed any doses.    Echo 1/12/2021 EF 45 to 50%, indeterminate LV diastolic function, full report as below. Stress test 1/12/2021 myocardial perfusion imaging indicated a normal study with no evidence of ischemia.    Cardiac Risk Factors: Hypertension, CKD, age, hyperlipidemia    Past Medical History:  Past Medical History:   Diagnosis Date   • Arrhythmia     atrial fib   • Atrial fibrillation (HCC)    • Chronic pain    • Hyperlipidemia    • Hypertension    • Lumbar herniated disc    • Neuropathy    • Spinal stenosis     at least 3 stable    Past Surgical History:  Past Surgical History:   Procedure Laterality Date   • CHOLECYSTECTOMY     • SUBTOTAL HYSTERECTOMY      partial, kept ovaries        Social History:   Social History      Tobacco Use   • Smoking status: Former Smoker     Quit date:      Years since quittin.0   • Smokeless tobacco: Never Used   Substance Use Topics   • Alcohol use: Not Currently        Family History:  Family History   Problem Relation Age of Onset   • Heart disease Mother    • Diabetes Father    • Hypertension Neg Hx    • Hyperlipidemia Neg Hx    • Heart failure Neg Hx    • Heart attack Neg Hx           Allergies:  No Known Allergies    Home Meds:  No current facility-administered medications on file prior to encounter.     Current Outpatient Medications on File Prior to Encounter   Medication Sig Dispense Refill   • albuterol sulfate  (90 Base) MCG/ACT inhaler Inhale 2 puffs Every 4 (Four) Hours As Needed for Wheezing.     • cetirizine (zyrTEC) 10 MG tablet Take 10 mg by mouth As Needed.     • cholecalciferol (VITAMIN D3) 10 MCG (400 UNIT) tablet Take 400 Units by mouth Daily.     • diazePAM (VALIUM) 5 MG tablet Take 5 mg by mouth 2 (Two) Times a Day.     • Eliquis 5 MG tablet tablet Take 5 mg by mouth 2 (Two) Times a Day.     • furosemide (LASIX) 20 MG tablet Take 20 mg by mouth Daily.     • metoprolol succinate XL (TOPROL-XL) 50 MG 24 hr tablet Take 1.5 tablets by mouth Daily. 30 tablet 11   • oxyCODONE-acetaminophen (PERCOCET)  MG per tablet Take 1 tablet by mouth Every 6 (Six) Hours As Needed.     • potassium chloride 10 MEQ CR tablet Take 10 mEq by mouth Daily.     • vitamin B-12 (CYANOCOBALAMIN) 1000 MCG tablet Take 1,000 mcg by mouth Daily.     • vitamin C (ASCORBIC ACID) 250 MG tablet Take 250 mg by mouth Daily.     • Calcet Petites 200-250 MG-UNIT tablet TAKE TWO TABLETS BY MOUTH EVERY DAY     • tiZANidine (ZANAFLEX) 4 MG tablet TK 1 T PO TID PRN     • [DISCONTINUED] vitamin D (ERGOCALCIFEROL) 1.25 MG (96825 UT) capsule capsule TK 1 C PO Q WK           Scheduled Meds:  apixaban, 5 mg, BID  furosemide, 20 mg, Daily  metoprolol succinate XL, 75 mg, Daily  potassium chloride, 10 mEq,  "Daily  sodium chloride, 10 mL, Q12H            INTAKE AND OUTPUT:    Intake/Output Summary (Last 24 hours) at 1/19/2022 1501  Last data filed at 1/19/2022 1130  Gross per 24 hour   Intake 120 ml   Output --   Net 120 ml           Review of Systems:  Review of Systems  Constitutional: No wt loss, fever   Gastrointestinal: No nausea , abdominal pain  Behavioral/Psych: No insomnia or anxiety   Cardiovascular ----positive for PALPITATION. All other systems reviewed and are negative          Constitutional:  Temp:  [98.1 °F (36.7 °C)] 98.1 °F (36.7 °C)  Heart Rate:  [125] 125  Resp:  [16] 16  BP: (153)/(93) 153/93    Physical Exam:           Physical Exam  /87 (BP Location: Left arm, Patient Position: Sitting)   Pulse 109   Temp 98.1 °F (36.7 °C) (Oral)   Resp 16   Ht 172.7 cm (68\")   Wt 107 kg (236 lb 3.2 oz)   SpO2 97%   BMI 35.91 kg/m²     General appearance: No acute changes   Eyes: Sclerae conjunctivae normal, pupils reactive   HENT: Atraumatic; oropharynx clear with moist mucous membranes and no mucosal ulcerations;  Neck: Trachea midline; NECK, supple, no thyromegaly or lymphadenopathy   Lungs: Normal size and shape, normal breath sounds, equal distribution of air, no rales and rhonchi   CV: S1-S2 regular, no murmurs, no rub, no gallop   Abdomen: Soft, nontender; no masses , no abnormal abdominal sounds   Extremities: No deformity , normal color , no peripheral edema   Skin: Normal temperature, turgor and texture; no rash, ulcers  Psych: Appropriate affect, alert and oriented to person, place and time                                Cardiographics    ECG:     Comparison ecg 1/13/22      Telemetry:      ECHO:    Interpretation Summary    · Estimated right ventricular systolic pressure from tricuspid regurgitation is normal (<35 mmHg).  · The left ventricular cavity is borderline dilated.  · Estimated left ventricular EF was in agreement with the calculated left ventricular EF. Left ventricular ejection " fraction appears to be 46 - 50%.  · Left ventricular diastolic function was indeterminate.  · Saline test neg for PFO    Interpretation Summary       · Findings consistent with a normal ECG stress test.  · Left ventricular ejection fraction is normal. (Calculated EF = 57%).  · Myocardial perfusion imaging indicates a normal myocardial perfusion study with no evidence of ischemia.  · Impressions are consistent with a low risk study.      Lab Review:  Results from last 7 days   Lab Units 01/19/22  1153   TROPONIN T ng/mL <0.010     Results from last 7 days   Lab Units 01/19/22  1153   MAGNESIUM mg/dL 1.9     Results from last 7 days   Lab Units 01/19/22  1153   SODIUM mmol/L 142   POTASSIUM mmol/L 4.0   BUN mg/dL 15   CREATININE mg/dL 0.98   CALCIUM mg/dL 9.4     @LABRCNTbnp@  Results from last 7 days   Lab Units 01/19/22  1153   WBC 10*3/mm3 5.14   HEMOGLOBIN g/dL 12.6   HEMATOCRIT % 41.9   PLATELETS 10*3/mm3 212     Results from last 7 days   Lab Units 01/19/22  1153   INR  1.34*   APTT seconds 32.6         CXR:    CHADS-VASc Risk Assessment              3 Total Score    1 Hypertension    1 Age 65-74    1 Sex: Female        Criteria that do not apply:    CHF    Age >/= 75    DM    PRIOR STROKE/TIA/THROMBO    Vascular Disease             The following medical decision was discussed in detail with Dr. Wilson    Assessment / Plan:    Atrial fibrillation  Hypertension      Recommendations:    This is a 71-year-old female who is known to our service. She has been admitted for A. fib with RVR. Amiodarone IV per protocol. We will plan for cardioversion tomorrow. She has been anticoagulated on Eliquis and reports that she has not missed any doses. She will receive an additional bolus of IV amiodarone 75 mg tonight.      Patient agreed to cardioversion, procedure risks/benefits(allergy, arrhythmia, skin trauma) and options of the cardioversion have been explained to the patient/family/PO.A.  He/she/all/they voiced  "understanding and agreement with treatment plan        Labs/tests ordered in am: consult IMN.p.o. after midnight, cardioversion, CBC, BMP, lipid panel, troponin, ECG.    Racheal Wilson MD    1/19/2022  15:01 EST    EMR Dragon/Transcription:   \"Dictated utilizing Dragon dictation\".       Electronically signed by Racheal Wilson MD at 01/19/22 3861       "

## 2022-01-20 NOTE — PAYOR COMM NOTE
"Fang Merritt (71 y.o. Female)                    Attention;   precert request for OBSERVATION , status , testing , and treatment .                     Reply to JUANITA palmt, Nevaeh Alexandra LPN fax   Or call                Date of Birth Social Security Number Address Home Phone MRN    1950  [id number] [facility] [phone] [id number]    Orthodoxy Marital Status             Spiritism        Admission Date Admission Type Admitting Provider Attending Provider Department, Room/Bed    1/19/22 Urgent Racheal Wilson MD Chandiramani, Shanker, MD [address], 2203/1    Discharge Date Discharge Disposition Discharge Destination                         Attending Provider: Racheal Wilson MD    Allergies: No Known Allergies    Isolation: None   Infection: None   Code Status: CPR   Advance Care Planning Activity    Ht: 172.7 cm (68\")   Wt: 107 kg (236 lb 3.2 oz)    Admission Cmt: [facility],  [facility]   Principal Problem: None                Active Insurance as of 1/19/2022     Primary Coverage     Payor Plan Insurance Group Employer/Plan Group    [facility]      Payor Plan Address Payor Plan Phone Number Payor Plan Fax Number Effective Dates    [address] [address] [phone]  6/12/2015 - None Entered    [facility] [id number]       Subscriber Name Subscriber Birth Date Member ID       FANG MERRITT 1950 [id number]           Secondary Coverage     Payor Plan Insurance Group Employer/Plan Group    [facility] MED[facility]PLACEMENT      Payor Plan Address Payor Plan Phone Number Payor Plan Fax Number Effective Dates    [address] [address] [phone]  12/1/2021 - None Entered    [facility] [id number]       Subscriber Name Subscriber Birth Date Member ID       FANG MERRITT 1950 [id number]                 Emergency Contacts      (Rel.) Home Phone Work Phone Mobile Phone    KRYSTALR, " AC (Sister) [phone] -- --               History & Physical      Racheal Wilson MD at 01/19/22 1500          [facility]  History & Physical Note                                                                                    Patient Identification:  Denia Merritt:   71 y.o.  female  1950  [id number]            No chief complaint on file.      Date of Admission:1/19/2022    Admitting Physician: Racheal Wilson    Reason for Admission:CARIO VERSION, No chief complaint on file.      History of Present Illness:     This is a 71-year-old female who was known to our service. She has a medical history to include chronic pain, hyperlipidemia, CKD stage III, hypertension, herniated disc, neuropathy and spinal stenosis. She has been admitted for A. fib with RVR. She was seen in office on 12/28/2021 by Nazia HARDY. She declined admission at that time. She followed up with Dr. Wilson on 1/13/2022 she was still in A. fib with rate in the 140s. At that time she was agreeable to admission. She has been admitted to initiate amiodarone therapy with a planned cardioversion tomorrow. She has been anticoagulated on Eliquis and reports that she has not missed any doses.    Echo 1/12/2021 EF 45 to 50%, indeterminate LV diastolic function, full report as below. Stress test 1/12/2021 myocardial perfusion imaging indicated a normal study with no evidence of ischemia.    Cardiac Risk Factors: Hypertension, CKD, age, hyperlipidemia    Past Medical History:  Past Medical History:   Diagnosis Date   • Arrhythmia     atrial fib   • Atrial fibrillation (HCC)    • Chronic pain    • Hyperlipidemia    • Hypertension    • Lumbar herniated disc    • Neuropathy    • Spinal stenosis     at least 3 stable    Past Surgical History:  Past Surgical History:   Procedure Laterality Date   • CHOLECYSTECTOMY     • SUBTOTAL HYSTERECTOMY      partial, kept ovaries        Social History:   Social  History     Tobacco Use   • Smoking status: Former Smoker     Quit date:      Years since quittin.0   • Smokeless tobacco: Never Used   Substance Use Topics   • Alcohol use: Not Currently        Family History:  Family History   Problem Relation Age of Onset   • Heart disease Mother    • Diabetes Father    • Hypertension Neg Hx    • Hyperlipidemia Neg Hx    • Heart failure Neg Hx    • Heart attack Neg Hx           Allergies:  No Known Allergies    Home Meds:  No current facility-administered medications on file prior to encounter.     Current Outpatient Medications on File Prior to Encounter   Medication Sig Dispense Refill   • albuterol sulfate  (90 Base) MCG/ACT inhaler Inhale 2 puffs Every 4 (Four) Hours As Needed for Wheezing.     • cetirizine (zyrTEC) 10 MG tablet Take 10 mg by mouth As Needed.     • cholecalciferol (VITAMIN D3) 10 MCG (400 UNIT) tablet Take 400 Units by mouth Daily.     • diazePAM (VALIUM) 5 MG tablet Take 5 mg by mouth 2 (Two) Times a Day.     • Eliquis 5 MG tablet tablet Take 5 mg by mouth 2 (Two) Times a Day.     • furosemide (LASIX) 20 MG tablet Take 20 mg by mouth Daily.     • metoprolol succinate XL (TOPROL-XL) 50 MG 24 hr tablet Take 1.5 tablets by mouth Daily. 30 tablet 11   • oxyCODONE-acetaminophen (PERCOCET)  MG per tablet Take 1 tablet by mouth Every 6 (Six) Hours As Needed.     • potassium chloride 10 MEQ CR tablet Take 10 mEq by mouth Daily.     • vitamin B-12 (CYANOCOBALAMIN) 1000 MCG tablet Take 1,000 mcg by mouth Daily.     • vitamin C (ASCORBIC ACID) 250 MG tablet Take 250 mg by mouth Daily.     • Calcet Petites 200-250 MG-UNIT tablet TAKE TWO TABLETS BY MOUTH EVERY DAY     • tiZANidine (ZANAFLEX) 4 MG tablet TK 1 T PO TID PRN     • [DISCONTINUED] vitamin D (ERGOCALCIFEROL) 1.25 MG (86973 UT) capsule capsule TK 1 C PO Q WK           Scheduled Meds:  apixaban, 5 mg, BID  furosemide, 20 mg, Daily  metoprolol succinate XL, 75 mg, Daily  potassium chloride,  "10 mEq, Daily  sodium chloride, 10 mL, Q12H            INTAKE AND OUTPUT:    Intake/Output Summary (Last 24 hours) at 1/19/2022 1501  Last data filed at 1/19/2022 1130  Gross per 24 hour   Intake 120 ml   Output --   Net 120 ml           Review of Systems:  Review of Systems  Constitutional: No wt loss, fever   Gastrointestinal: No nausea , abdominal pain  Behavioral/Psych: No insomnia or anxiety   Cardiovascular ----positive for PALPITATION. All other systems reviewed and are negative          Constitutional:  Temp:  [98.1 °F (36.7 °C)] 98.1 °F (36.7 °C)  Heart Rate:  [125] 125  Resp:  [16] 16  BP: (153)/(93) 153/93    Physical Exam:           Physical Exam  /87 (BP Location: Left arm, Patient Position: Sitting)   Pulse 109   Temp 98.1 °F (36.7 °C) (Oral)   Resp 16   Ht 172.7 cm (68\")   Wt 107 kg (236 lb 3.2 oz)   SpO2 97%   BMI 35.91 kg/m²     General appearance: No acute changes   Eyes: Sclerae conjunctivae normal, pupils reactive   HENT: Atraumatic; oropharynx clear with moist mucous membranes and no mucosal ulcerations;  Neck: Trachea midline; NECK, supple, no thyromegaly or lymphadenopathy   Lungs: Normal size and shape, normal breath sounds, equal distribution of air, no rales and rhonchi   CV: S1-S2 regular, no murmurs, no rub, no gallop   Abdomen: Soft, nontender; no masses , no abnormal abdominal sounds   Extremities: No deformity , normal color , no peripheral edema   Skin: Normal temperature, turgor and texture; no rash, ulcers  Psych: Appropriate affect, alert and oriented to person, place and time                                Cardiographics    ECG:     Comparison ecg 1/13/22      Telemetry:      ECHO:    Interpretation Summary    · Estimated right ventricular systolic pressure from tricuspid regurgitation is normal (<35 mmHg).  · The left ventricular cavity is borderline dilated.  · Estimated left ventricular EF was in agreement with the calculated left ventricular EF. Left ventricular " ejection fraction appears to be 46 - 50%.  · Left ventricular diastolic function was indeterminate.  · Saline test neg for PFO    Interpretation Summary       · Findings consistent with a normal ECG stress test.  · Left ventricular ejection fraction is normal. (Calculated EF = 57%).  · Myocardial perfusion imaging indicates a normal myocardial perfusion study with no evidence of ischemia.  · Impressions are consistent with a low risk study.      Lab Review:  Results from last 7 days   Lab Units 01/19/22  1153   TROPONIN T ng/mL <0.010     Results from last 7 days   Lab Units 01/19/22  1153   MAGNESIUM mg/dL 1.9     Results from last 7 days   Lab Units 01/19/22  1153   SODIUM mmol/L 142   POTASSIUM mmol/L 4.0   BUN mg/dL 15   CREATININE mg/dL 0.98   CALCIUM mg/dL 9.4     @LABRCNTbnp@  Results from last 7 days   Lab Units 01/19/22  1153   WBC 10*3/mm3 5.14   HEMOGLOBIN g/dL 12.6   HEMATOCRIT % 41.9   PLATELETS 10*3/mm3 212     Results from last 7 days   Lab Units 01/19/22  1153   INR  1.34*   APTT seconds 32.6         CXR:    CHADS-VASc Risk Assessment              3 Total Score    1 Hypertension    1 Age 65-74    1 Sex: Female        Criteria that do not apply:    CHF    Age >/= 75    DM    PRIOR STROKE/TIA/THROMBO    Vascular Disease             The following medical decision was discussed in detail with Dr. Wilson    Assessment / Plan:    Atrial fibrillation  Hypertension      Recommendations:    This is a 71-year-old female who is known to our service. She has been admitted for A. fib with RVR. Amiodarone IV per protocol. We will plan for cardioversion tomorrow. She has been anticoagulated on Eliquis and reports that she has not missed any doses. She will receive an additional bolus of IV amiodarone 75 mg tonight.      Patient agreed to cardioversion, procedure risks/benefits(allergy, arrhythmia, skin trauma) and options of the cardioversion have been explained to the patient/family/PO.A.  He/she/all/they  "voiced understanding and agreement with treatment plan        Labs/tests ordered in am: consult IMN.p.o. after midnight, cardioversion, CBC, BMP, lipid panel, troponin, ECG.    Racheal Wilson MD    1/19/2022  15:01 EST    EMR Dragon/Transcription:   \"Dictated utilizing Dragon dictation\".       Electronically signed by Racheal Wilson MD at 01/19/22 7894       "

## 2022-01-21 ENCOUNTER — APPOINTMENT (OUTPATIENT)
Dept: CARDIOLOGY | Facility: HOSPITAL | Age: 72
End: 2022-01-21

## 2022-01-21 VITALS
HEIGHT: 68 IN | OXYGEN SATURATION: 100 % | HEART RATE: 52 BPM | DIASTOLIC BLOOD PRESSURE: 76 MMHG | BODY MASS INDEX: 35.8 KG/M2 | WEIGHT: 236.2 LBS | RESPIRATION RATE: 16 BRPM | SYSTOLIC BLOOD PRESSURE: 136 MMHG | TEMPERATURE: 97.8 F

## 2022-01-21 LAB
ANION GAP SERPL CALCULATED.3IONS-SCNC: 11.8 MMOL/L (ref 5–15)
BUN SERPL-MCNC: 18 MG/DL (ref 8–23)
BUN/CREAT SERPL: 16.5 (ref 7–25)
CALCIUM SPEC-SCNC: 9 MG/DL (ref 8.6–10.5)
CHLORIDE SERPL-SCNC: 104 MMOL/L (ref 98–107)
CO2 SERPL-SCNC: 25.2 MMOL/L (ref 22–29)
CREAT SERPL-MCNC: 1.09 MG/DL (ref 0.57–1)
GFR SERPL CREATININE-BSD FRML MDRD: 60 ML/MIN/1.73
GLUCOSE SERPL-MCNC: 129 MG/DL (ref 65–99)
POTASSIUM SERPL-SCNC: 4.5 MMOL/L (ref 3.5–5.2)
SODIUM SERPL-SCNC: 141 MMOL/L (ref 136–145)

## 2022-01-21 PROCEDURE — G0378 HOSPITAL OBSERVATION PER HR: HCPCS

## 2022-01-21 PROCEDURE — 94799 UNLISTED PULMONARY SVC/PX: CPT

## 2022-01-21 PROCEDURE — 93005 ELECTROCARDIOGRAM TRACING: CPT

## 2022-01-21 PROCEDURE — 99217 PR OBSERVATION CARE DISCHARGE MANAGEMENT: CPT

## 2022-01-21 PROCEDURE — 93246 EXT ECG>7D<15D RECORDING: CPT

## 2022-01-21 PROCEDURE — 80048 BASIC METABOLIC PNL TOTAL CA: CPT | Performed by: HOSPITALIST

## 2022-01-21 PROCEDURE — 93010 ELECTROCARDIOGRAM REPORT: CPT | Performed by: INTERNAL MEDICINE

## 2022-01-21 RX ORDER — AMIODARONE HYDROCHLORIDE 200 MG/1
200 TABLET ORAL
Qty: 30 TABLET | Refills: 5 | Status: SHIPPED | OUTPATIENT
Start: 2022-01-22 | End: 2022-05-17

## 2022-01-21 RX ORDER — METOPROLOL SUCCINATE 25 MG/1
25 TABLET, EXTENDED RELEASE ORAL DAILY
Qty: 30 TABLET | Refills: 11 | Status: SHIPPED | OUTPATIENT
Start: 2022-01-22 | End: 2022-08-23 | Stop reason: SDUPTHER

## 2022-01-21 RX ADMIN — PANTOPRAZOLE SODIUM 40 MG: 40 TABLET, DELAYED RELEASE ORAL at 06:28

## 2022-01-21 RX ADMIN — POTASSIUM CHLORIDE 10 MEQ: 750 TABLET, EXTENDED RELEASE ORAL at 08:41

## 2022-01-21 RX ADMIN — FUROSEMIDE 20 MG: 20 TABLET ORAL at 08:41

## 2022-01-21 RX ADMIN — APIXABAN 5 MG: 5 TABLET, FILM COATED ORAL at 08:41

## 2022-01-21 RX ADMIN — AMIODARONE HYDROCHLORIDE 200 MG: 200 TABLET ORAL at 08:41

## 2022-01-21 RX ADMIN — SODIUM CHLORIDE, PRESERVATIVE FREE 10 ML: 5 INJECTION INTRAVENOUS at 08:43

## 2022-01-21 NOTE — DISCHARGE SUMMARY
[facility]  Physician Discharge Summary    Patient Identification:  Name: Denia Merritt  Age: 71 y.o.  Sex: female  :  1950  MRN: [id number]    Admit date: 2022    Discharge date and time: 22 11:29      Admitting Physician: Racheal Wilson MD     Discharge Physician: Radha Lake APRN    Discharge Diagnoses:   Atrial Fibrillation  Patient Active Problem List   Diagnosis   • Abnormal EKG   • Atrial fibrillation (HCC)       Discharged Condition: stable    Hospital Course:     This is a 71-year-old female who was admitted for atrial fibrillation with planned cardioversion.  She was initiated on IV amiodarone.  Dr Umana was consulted for management of her chronic medical conditions. She had successful cardioversion on 2022.  She was transitioned to oral amiodarone.  No complications and recovery from cardioversion as expected.  QTC stable.  She did have some bradycardia, with heart into the 40s, but has remained asymptomatic.  She will go home with [facility], and follow up for results outpatient.  She is cardiovascular stable for discharge to follow up outpatient.    Consults:   IP CONSULT TO INTERNAL MEDICINE    Discharge Exam:  General: No acute distress   Skin: Warm and dry, no diaphoresis noted   EYES: PERTL   HEENT: external ear and nose normal; oral mucosa moist   Neck: Supple; no carotid bruits; no JVD   Heart: S1S2 bradycardic rate and rhythm; no murmurs; no gallop or rub appreciated   Pulmonary: Respirations regular, unlabored at rest, bilateral breath sounds have good air entry throughout all lung fields; no crackles, rubs or wheezes auscultated.     GI: Soft, non-tender, non-distended, positive bowel sounds  No hepatosplenomegaly   Extremities: Bilateral lower extremities have no pre-tibial pitting edema; DP/PT pulses are palpable   Neurological: Alert and oriented x 3; no neuro deficits          LABS:  Results from last 7 days   Lab Units 22  1109 22  1153    TROPONIN T ng/mL <0.010 <0.010     Results from last 7 days   Lab Units 01/19/22  1153   MAGNESIUM mg/dL 1.9     Results from last 7 days   Lab Units 01/21/22  0519   SODIUM mmol/L 141   POTASSIUM mmol/L 4.5   BUN mg/dL 18   CREATININE mg/dL 1.09*   CALCIUM mg/dL 9.0       Results from last 7 days   Lab Units 01/20/22  0458 01/19/22  1153   WBC 10*3/mm3 4.63 5.14   HEMOGLOBIN g/dL 11.7* 12.6   HEMATOCRIT % 38.7 41.9   PLATELETS 10*3/mm3 153 212     Results from last 7 days   Lab Units 01/19/22  1153   INR  1.34*   APTT seconds 32.6     Results from last 7 days   Lab Units 01/20/22  0458   CHOLESTEROL mg/dL 123   TRIGLYCERIDES mg/dL 73   HDL CHOL mg/dL 34*   LDL CHOL mg/dL 74     Disposition:  Home    Discharge Medications:      Discharge Medications      New Medications      Instructions Start Date   amiodarone 200 MG tablet  Commonly known as: PACERONE   200 mg, Oral, Every 24 Hours Scheduled   Start Date: January 22, 2022        Changes to Medications      Instructions Start Date   metoprolol succinate XL 25 MG 24 hr tablet  Commonly known as: TOPROL-XL  What changed:   · medication strength  · how much to take   25 mg, Oral, Daily   Start Date: January 22, 2022        Continue These Medications      Instructions Start Date   albuterol sulfate  (90 Base) MCG/ACT inhaler  Commonly known as: PROVENTIL HFA;VENTOLIN HFA;PROAIR HFA   2 puffs, Inhalation, Every 4 Hours PRN      Calcet Petites 200-250 MG-UNIT tablet  Generic drug: Calcium-Cholecalciferol   TAKE TWO TABLETS BY MOUTH EVERY DAY      cetirizine 10 MG tablet  Commonly known as: zyrTEC   10 mg, Oral, As Needed      cholecalciferol 10 MCG (400 UNIT) tablet  Commonly known as: VITAMIN D3   400 Units, Oral, Daily      diazePAM 5 MG tablet  Commonly known as: VALIUM   5 mg, Oral, 2 Times Daily      Eliquis 5 MG tablet tablet  Generic drug: apixaban   5 mg, Oral, 2 Times Daily      furosemide 20 MG tablet  Commonly known as: LASIX   20 mg, Oral, Daily     "  oxyCODONE-acetaminophen  MG per tablet  Commonly known as: PERCOCET   1 tablet, Oral, Every 6 Hours PRN      potassium chloride 10 MEQ CR tablet   10 mEq, Oral, Daily      tiZANidine 4 MG tablet  Commonly known as: ZANAFLEX   TK 1 T PO TID PRN      vitamin B-12 1000 MCG tablet  Commonly known as: CYANOCOBALAMIN   1,000 mcg, Oral, Daily      vitamin C 250 MG tablet  Commonly known as: ASCORBIC ACID   250 mg, Oral, Daily               Discharge Home Instructions:  1. Follow up with Dr Wilson on February 7 at 12:30  2. Follow up with Dr Morataya in 1-2 weeks  Take all medications as prescribed.   Please keep all scheduled appointments  Please take all medication as prescribed.  Please check BP at home and call the office for BP that is consistently greater than 160/90  Please go to ER for any new or worsening concerns.       Signed:  Radha Lake, APRN  1/21/2022  11:24 EST      EMR Dragon/Transcription:   \"Dictated utilizing Dragon dictation\".     "

## 2022-01-21 NOTE — CASE MANAGEMENT/SOCIAL WORK
Discharge Planning Assessment  [facility]     Patient Name: Denia Merritt  MRN: [id number]  Today's Date: 1/21/2022    Admit Date: 1/19/2022     Discharge Needs Assessment     Row Name 01/21/22 1008       Living Environment    Lives With alone    Current Living Arrangements home/apartment/condo    Primary Care Provided by self    Provides Primary Care For no one    Family Caregiver if Needed sibling(s)    Family Caregiver Names sister Gloria Marina [phone]    Quality of Family Relationships helpful    Able to Return to Prior Arrangements yes       Resource/Environmental Concerns    Resource/Environmental Concerns none       Transition Planning    Patient/Family Anticipates Transition to home    Patient/Family Anticipated Services at Transition none    Transportation Anticipated family or friend will provide       Discharge Needs Assessment    Readmission Within the Last 30 Days no previous admission in last 30 days    Equipment Currently Used at Home cane, straight; walker, standard    Concerns to be Addressed denies needs/concerns at this time    Anticipated Changes Related to Illness none    Equipment Needed After Discharge none               Discharge Plan     Row Name 01/21/22 1010       Plan    Plan Return home    Patient/Family in Agreement with Plan yes    Plan Comments Spoke with patient at bedside.  She lives alone, is IADL, has a cane and a walker that she uses at time if needed.  She has never used HH or been to SNF.  PCP is Nazia HARDY and pharmacy is internal pharmacy is Dr. Eddie Morataya's office ([phone]).  Emergency contact is her sister Gloria Marina [phone].  Patient states family or friends would assist her if she needs help.  She plans to return home at NH.  CCP will follow.  BHumeniuk RN              Continued Care and Services - Admitted Since 1/19/2022    Coordination has not been started for this encounter.       Expected Discharge Date and Time     Expected  Discharge Date Expected Discharge Time    Jan 22, 2022          Demographic Summary     Row Name 01/21/22 1007       General Information    Admission Type observation    Arrived From home    Referral Source admission list    Reason for Consult discharge planning    Preferred Language English     Used During This Interaction no               Functional Status     Row Name 01/21/22 1007       Functional Status    Usual Activity Tolerance good    Current Activity Tolerance moderate       Functional Status, IADL    Medications independent    Meal Preparation independent    Housekeeping independent    Laundry independent    Shopping independent       Mental Status    General Appearance WDL WDL       Mental Status Summary    Recent Changes in Mental Status/Cognitive Functioning no changes                         Becky S. Humeniuk, RN

## 2022-01-21 NOTE — PROGRESS NOTES
Clinical Pharmacy Services-Discharge Medication Education    Denia Merritt was started on amiodarone and a decreased dose of metoprolol s/p cardioversion for atrial fibrillation. Counseling points included the followin) Explained indication of each new medication, and patient's need for these medications.  2) Went over dosing and frequency of each new medication.  3) Discussed any special administration, storage, or monitoring instructions with medications.  4) Discussed all important drug interactions, including over-the-counter medications and supplements.  5) Explained possible side effects for each new medication.  6) Instructed the patient not to begin or discontinue any medications without informing his/her physician/pharmacist.    Patient expressed understanding and had no further questions.      Aurelia Cline, Pharm.D., [facility]   Clinical Staff Pharmacist  Phone Extension #[phone]

## 2022-01-21 NOTE — PROGRESS NOTES
"Daily progress note    Chief complaint  Doing same  No new complaints  Wants to go home  Denies chest pain shortness of breath palpitation    History of present illness  71-year-old female with history of atrial fibrillation hypertension hyperlipidemia low back pain admitted to cardiology service from their office for cardioversion in a.m.  Patient complained of shortness of breath but denies any chest pain palpitation.  Patient also denies any fever cough congestion night sweats weight loss or weight gain.  I am asked to follow the patient for medical problem.    Review of Systems:  As above    Physical examination  Blood pressure 136/76, pulse 52, temperature 97.8 °F (36.6 °C), temperature source Oral, resp. rate 16, height 172.7 cm (68\"), weight 107 kg (236 lb 3.2 oz), SpO2 100 %.    General:  Appears in no acute distress  Eyes: PERTL,  HEENT:  No JVD. Thyroid not visibly enlarged. No mucosal pallor or cyanosis  Respiratory:  Normal effort moving air bilaterally no crackles, rubs or wheezes auscultated  Cardiovascular: Irregular S1S2 No murmur, rub or gallop auscultated.  Gastrointestinal: Abdomen soft, flat, non tender. Bowel sounds present.   Musculoskeletal: SEYMUOR x4. No abnormal movements  Extremities: No digital clubbing or cyanosis  Skin: Color pink. Skin warm and dry to touch. No rashes  No xanthoma  Neuro: AAO x3 CN II-XII grossly intact    LABS  Lab Results (last 24 hours)     Procedure Component Value Units Date/Time    Basic Metabolic Panel [[id number]]  (Abnormal) Collected: 01/21/22 0519    Specimen: Blood Updated: 01/21/22 0651     Glucose 129 mg/dL      BUN 18 mg/dL      Creatinine 1.09 mg/dL      Sodium 141 mmol/L      Potassium 4.5 mmol/L      Comment: Slight hemolysis detected by analyzer. Results may be affected.        Chloride 104 mmol/L      CO2 25.2 mmol/L      Calcium 9.0 mg/dL      eGFR  African Amer 60 mL/min/1.73      BUN/Creatinine Ratio 16.5     Anion Gap 11.8 mmol/L     Narrative:      " GFR Normal >60  Chronic Kidney Disease <60  Kidney Failure <15          Imaging Results (Last 24 Hours)     ** No results found for the last 24 hours. **             ECG 12 Lead  Component   Ref Range & Units 1/19/22 1035   QT Interval   ms 318              HEART RATE= 133  bpm  RR Interval= 452  ms  CA Interval=   ms  P Horizontal Axis=   deg  P Front Axis=   deg  QRSD Interval= 74  ms  QT Interval= 318  ms  QRS Axis= 79  deg  T Wave Axis= 65  deg  - ABNORMAL ECG -  Atrial fibrillation  NO PRIOR TRACING AVAILABLE FOR COMPARISON             Current Facility-Administered Medications:   •  albuterol (PROVENTIL) nebulizer solution 0.083% 2.5 mg/3mL, 2.5 mg, Nebulization, Q6H PRN, Radha Lake APRN  •  amiodarone (PACERONE) tablet 200 mg, 200 mg, Oral, Q24H, Nazia Baird APRN, 200 mg at 01/21/22 0841  •  apixaban (ELIQUIS) tablet 5 mg, 5 mg, Oral, BID, Radha Lake APRN, 5 mg at 01/21/22 0841  •  furosemide (LASIX) tablet 20 mg, 20 mg, Oral, Daily, Radha Lake APRN, 20 mg at 01/21/22 0841  •  metoprolol succinate XL (TOPROL-XL) 24 hr tablet 75 mg, 75 mg, Oral, Daily, Nazia Baird APRN, 75 mg at 01/19/22 1211  •  nitroglycerin (NITROSTAT) SL tablet 0.4 mg, 0.4 mg, Sublingual, Q5 Min PRN, Radha Lake APRN  •  oxyCODONE-acetaminophen (PERCOCET)  MG per tablet 1 tablet, 1 tablet, Oral, Q6H PRN, Racheal Wilson MD, 1 tablet at 01/20/22 1821  •  pantoprazole (PROTONIX) EC tablet 40 mg, 40 mg, Oral, Q AM, Joseph Umana MD, 40 mg at 01/21/22 0628  •  potassium chloride (K-DUR,KLOR-CON) ER tablet 10 mEq, 10 mEq, Oral, Daily, Radha Lake APRN, 10 mEq at 01/21/22 0841  •  sodium chloride 0.9 % flush 10 mL, 10 mL, Intravenous, Q12H, Radha Lake, APRN, 10 mL at 01/21/22 0843  •  sodium chloride 0.9 % flush 10 mL, 10 mL, Intravenous, PRN, Radha Lake, APRN     ASSESSMENT  Chronic atrial fibrillation admit status post  cardioversion  Hypertension  Hyperlipidemia  Diastolic congestive heart failure  Chronic low back pain  Gastroesophageal reflux disease    PLAN  CPM  Post cardioversion care  Amiodarone  Anticoagulation  Continue home medications  Stress ulcer DVT prophylaxis  Supportive care  Activity as tolerated  Discussed with nursing staff  Okay to discharge    RHODA LEWIS MD

## 2022-01-22 NOTE — NURSING NOTE
holter monitor in place patient verbalized understanding of instructions and importance of keeping the box to mail monitor back, all instructions reviewed and discussed. All questions answered, parameters of administration of metoprolol at home reviewed. Patient arranged ride, none from facility needed. No distress noted patient assisted to entrance a for discharge.

## 2022-01-24 PROBLEM — Z92.29 HISTORY OF AMIODARONE THERAPY: Status: ACTIVE | Noted: 2022-01-24

## 2022-01-24 PROBLEM — Z01.810 PRE-OPERATIVE CARDIOVASCULAR EXAMINATION: Status: ACTIVE | Noted: 2022-01-24

## 2022-01-24 PROBLEM — Z79.01 CHRONIC ANTICOAGULATION: Status: ACTIVE | Noted: 2022-01-24

## 2022-01-24 LAB
QT INTERVAL: 496 MS
QT INTERVAL: 499 MS

## 2022-01-31 RX ORDER — METOPROLOL SUCCINATE 25 MG/1
25 TABLET, EXTENDED RELEASE ORAL DAILY
Qty: 30 TABLET | Refills: 11 | Status: CANCELLED | OUTPATIENT
Start: 2022-01-31

## 2022-02-07 ENCOUNTER — OFFICE VISIT (OUTPATIENT)
Dept: CARDIOLOGY | Facility: CLINIC | Age: 72
End: 2022-02-07

## 2022-02-07 VITALS
BODY MASS INDEX: 35.46 KG/M2 | HEART RATE: 48 BPM | SYSTOLIC BLOOD PRESSURE: 180 MMHG | WEIGHT: 234 LBS | DIASTOLIC BLOOD PRESSURE: 83 MMHG | HEIGHT: 68 IN

## 2022-02-07 DIAGNOSIS — Z79.01 CHRONIC ANTICOAGULATION: ICD-10-CM

## 2022-02-07 DIAGNOSIS — R00.1 BRADYCARDIA, SINUS: ICD-10-CM

## 2022-02-07 DIAGNOSIS — I48.0 PAROXYSMAL ATRIAL FIBRILLATION: Primary | ICD-10-CM

## 2022-02-07 DIAGNOSIS — Z92.29 HISTORY OF AMIODARONE THERAPY: ICD-10-CM

## 2022-02-07 PROCEDURE — 93000 ELECTROCARDIOGRAM COMPLETE: CPT | Performed by: INTERNAL MEDICINE

## 2022-02-07 PROCEDURE — 99214 OFFICE O/P EST MOD 30 MIN: CPT | Performed by: INTERNAL MEDICINE

## 2022-02-07 NOTE — PROGRESS NOTES
HOSPITAL FOLLOW UP CARDIOVERSION     Subjective:        Denia Merritt is a 71 y.o. female who here for follow up    CC  POST CV  BRADYCARDIA  HPI  71-year-old female with atrial fibrillation on amiodarone therapy now in normal sinus rhythm as sinus bradycardia denies any chest pains or tightness in the chest     Problems Addressed this Visit        Cardiac and Vasculature    Atrial fibrillation (HCC) - Primary    History of amiodarone therapy    Bradycardia, sinus       Coag and Thromboembolic    Chronic anticoagulation      Diagnoses       Codes Comments    Paroxysmal atrial fibrillation (HCC)    -  Primary ICD-10-CM: I48.0  ICD-9-CM: 427.31     History of amiodarone therapy     ICD-10-CM: Z92.29  ICD-9-CM: V87.49     Chronic anticoagulation     ICD-10-CM: Z79.01  ICD-9-CM: V58.61     Bradycardia, sinus     ICD-10-CM: R00.1  ICD-9-CM: 427.89         .    The following portions of the patient's history were reviewed and updated as appropriate: allergies, current medications, past family history, past medical history, past social history, past surgical history and problem list.    Past Medical History:   Diagnosis Date   • Arrhythmia     atrial fib   • Atrial fibrillation (HCC)    • Chronic pain    • Hyperlipidemia    • Hypertension    • Lumbar herniated disc    • Neuropathy    • Spinal stenosis      reports that she quit smoking about 21 years ago. She has never used smokeless tobacco. She reports previous alcohol use. She reports that she does not use drugs.   Family History   Problem Relation Age of Onset   • Heart disease Mother    • Diabetes Father    • Hypertension Neg Hx    • Hyperlipidemia Neg Hx    • Heart failure Neg Hx    • Heart attack Neg Hx        Review of Systems  Constitutional: No wt loss, fever, fatigue  Gastrointestinal: No nausea, abdominal pain  Behavioral/Psych: No insomnia or anxiety   Cardiovascular no chest pains or tightness in the chest  Objective:       Physical Exam  /83   Pulse  "(!) 48   Ht 172.7 cm (68\")   Wt 106 kg (234 lb)   BMI 35.58 kg/m²   General appearance: No acute changes   Neck: Trachea midline; NECK, supple, no thyromegaly or lymphadenopathy   Lungs: Normal size and shape, normal breath sounds, equal distribution of air, no rales and rhonchi   CV: S1-S2 regular, no murmurs, no rub, no gallop   Abdomen: Soft, nontender; no masses , no abnormal abdominal sounds   Extremities: No deformity , normal color , no peripheral edema   Skin: Normal temperature, turgor and texture; no rash, ulcers            ECG 12 Lead    Date/Time: 2/7/2022 12:22 PM  Performed by: Rachela Wilson MD  Authorized by: Racheal Wilson MD   Comparison: compared with previous ECG   Similar to previous ECG  Rhythm: sinus bradycardia    Clinical impression: abnormal EKG              Echocardiogram:        Current Outpatient Medications:   •  albuterol sulfate  (90 Base) MCG/ACT inhaler, Inhale 2 puffs Every 4 (Four) Hours As Needed for Wheezing., Disp: , Rfl:   •  amiodarone (PACERONE) 200 MG tablet, Take 1 tablet by mouth Daily., Disp: 30 tablet, Rfl: 5  •  Calcet Petites 200-250 MG-UNIT tablet, TAKE TWO TABLETS BY MOUTH EVERY DAY, Disp: , Rfl:   •  cetirizine (zyrTEC) 10 MG tablet, Take 10 mg by mouth As Needed., Disp: , Rfl:   •  cholecalciferol (VITAMIN D3) 10 MCG (400 UNIT) tablet, Take 400 Units by mouth Daily., Disp: , Rfl:   •  diazePAM (VALIUM) 5 MG tablet, Take 5 mg by mouth 2 (Two) Times a Day., Disp: , Rfl:   •  Eliquis 5 MG tablet tablet, Take 5 mg by mouth 2 (Two) Times a Day., Disp: , Rfl:   •  furosemide (LASIX) 20 MG tablet, Take 20 mg by mouth Daily., Disp: , Rfl:   •  metoprolol succinate XL (TOPROL-XL) 25 MG 24 hr tablet, Take 1 tablet by mouth Daily., Disp: 30 tablet, Rfl: 11  •  oxyCODONE-acetaminophen (PERCOCET)  MG per tablet, Take 1 tablet by mouth Every 6 (Six) Hours As Needed., Disp: , Rfl:   •  potassium chloride 10 MEQ CR tablet, Take 10 mEq by mouth " Daily., Disp: , Rfl:   •  tiZANidine (ZANAFLEX) 4 MG tablet, TK 1 T PO TID PRN, Disp: , Rfl:   •  vitamin B-12 (CYANOCOBALAMIN) 1000 MCG tablet, Take 1,000 mcg by mouth Daily., Disp: , Rfl:   •  vitamin C (ASCORBIC ACID) 250 MG tablet, Take 250 mg by mouth Daily., Disp: , Rfl:    Assessment:        Patient Active Problem List   Diagnosis   • Abnormal EKG   • Atrial fibrillation (HCC)   • Pre-operative cardiovascular examination   • History of amiodarone therapy   • Chronic anticoagulation               Plan:            ICD-10-CM ICD-9-CM   1. Paroxysmal atrial fibrillation (HCC)  I48.0 427.31   2. History of amiodarone therapy  Z92.29 V87.49   3. Chronic anticoagulation  Z79.01 V58.61   4. Bradycardia, sinus  R00.1 427.89     1. Paroxysmal atrial fibrillation (HCC)  Atrial fibrillation under control    2. History of amiodarone therapy  Denia Merritt IS ON AMIODARONE,   Significant side effects associated with this medication has been explained     Pros and cons of the medications has been discussed, decision has been to continue the medication   6 months to yearly checkup for eye examination, thyroid function test, chest x-ray and liver function test has been advised    Patient understands well      3. Chronic anticoagulation  Continue current treatment    4. Bradycardia, sinus  Asymptomatic     POST CV STABLE  SEE IN 3 MONTHS WHEN AMIODARONE WILL BE REDUCED  MG  COUNSELING:    Deniakelli Girard was given to patient for the following topics: diagnostic results, risk factor reductions, impressions, risks and benefits of treatment options and importance of treatment compliance .       SMOKING COUNSELING:    [unfilled]    Dictated using Dragon dictation

## 2022-02-14 LAB
MAXIMAL PREDICTED HEART RATE: 148 BPM
STRESS TARGET HR: 126 BPM

## 2022-02-14 PROCEDURE — 93248 EXT ECG>7D<15D REV&INTERPJ: CPT | Performed by: INTERNAL MEDICINE

## 2022-02-15 ENCOUNTER — TELEPHONE (OUTPATIENT)
Dept: CARDIOLOGY | Facility: CLINIC | Age: 72
End: 2022-02-15

## 2022-02-15 NOTE — TELEPHONE ENCOUNTER
----- Message from Kay Cline sent at 2/8/2022  4:30 PM EST -----  Regarding: RE AMIODARONE SIDE EFFECTS  Contact: [phone]  CELL [phone]    [facility] to r/c

## 2022-05-17 ENCOUNTER — OFFICE VISIT (OUTPATIENT)
Dept: CARDIOLOGY | Facility: CLINIC | Age: 72
End: 2022-05-17

## 2022-05-17 VITALS
HEART RATE: 58 BPM | HEIGHT: 68 IN | BODY MASS INDEX: 35.01 KG/M2 | DIASTOLIC BLOOD PRESSURE: 64 MMHG | WEIGHT: 231 LBS | SYSTOLIC BLOOD PRESSURE: 132 MMHG

## 2022-05-17 DIAGNOSIS — R00.1 BRADYCARDIA, SINUS: ICD-10-CM

## 2022-05-17 DIAGNOSIS — Z92.29 HISTORY OF AMIODARONE THERAPY: ICD-10-CM

## 2022-05-17 DIAGNOSIS — I10 PRIMARY HYPERTENSION: ICD-10-CM

## 2022-05-17 DIAGNOSIS — I48.0 PAROXYSMAL ATRIAL FIBRILLATION: Primary | ICD-10-CM

## 2022-05-17 DIAGNOSIS — Z79.01 CHRONIC ANTICOAGULATION: ICD-10-CM

## 2022-05-17 PROCEDURE — 99214 OFFICE O/P EST MOD 30 MIN: CPT

## 2022-05-17 PROCEDURE — 93000 ELECTROCARDIOGRAM COMPLETE: CPT

## 2022-05-17 RX ORDER — AMIODARONE HYDROCHLORIDE 200 MG/1
100 TABLET ORAL
Qty: 45 TABLET | Refills: 3 | Status: SHIPPED | OUTPATIENT
Start: 2022-05-17 | End: 2022-08-23

## 2022-05-17 NOTE — PROGRESS NOTES
Subjective:        Denia Merritt is a 71 y.o. female who here for follow up    Chief Complaint   Patient presents with   • Follow-up     3 month        HPI    This is a 71-year-old female who follows up for management of atrial fibrillation.  She has atrial fibrillation, hyperlipidemia, hypertension.  She had successful cardioversion 1/20/2022. Echo 1/12/2021 EF 46 to 50%, LV C borderline dilated, indeterminate LV diastolic function. Stress test 1/12/2021 myocardial perfusion imaging indicates a normal study with no evidence of ischemia. Holter monitor 1/21/2022 normal sinus rhythm with occasional PACs, NS SVTs.    The following portions of the patient's history were reviewed and updated as appropriate: allergies, current medications, past family history, past medical history, past social history, past surgical history and problem list.    Past Medical History:   Diagnosis Date   • Arrhythmia     atrial fib   • Atrial fibrillation (HCC)    • Chronic pain    • Hyperlipidemia    • Hypertension    • Lumbar herniated disc    • Neuropathy    • Spinal stenosis          reports that she quit smoking about 21 years ago. She has never used smokeless tobacco. She reports previous alcohol use. She reports that she does not use drugs.     Family History   Problem Relation Age of Onset   • Heart disease Mother    • Diabetes Father    • Hypertension Neg Hx    • Hyperlipidemia Neg Hx    • Heart failure Neg Hx    • Heart attack Neg Hx        ROS     Review of Systems  Constitutional: No wt loss, fever, fatigue  Gastrointestinal: No nausea, abdominal pain  Behavioral/Psych: No insomnia or anxiety  Cardiovascular no chest pain or shortness of breath      Objective:           Vitals and nursing note reviewed.   Constitutional:       Appearance: Well-developed.   HENT:      Head: Normocephalic.      Right Ear: External ear normal.      Left Ear: External ear normal.   Neck:      Vascular: No JVD.   Pulmonary:      Effort: Pulmonary  effort is normal. No respiratory distress.      Breath sounds: Normal breath sounds. No stridor. No rales.   Cardiovascular:      Normal rate. Regular rhythm.      No gallop.   Pulses:     Intact distal pulses.   Abdominal:      General: Bowel sounds are normal. There is no distension.      Palpations: Abdomen is soft.      Tenderness: There is no abdominal tenderness. There is no guarding.   Musculoskeletal: Normal range of motion.         General: No tenderness.      Cervical back: Normal range of motion. Skin:     General: Skin is warm.   Neurological:      Mental Status: Alert and oriented to person, place, and time.      Deep Tendon Reflexes: Reflexes are normal and symmetric.   Psychiatric:         Judgment: Judgment normal.           ECG 12 Lead    Date/Time: 5/17/2022 11:38 AM  Performed by: Radha Lake APRN  Authorized by: Radha Lake APRN   Comparison: compared with previous ECG from 2/7/2022  Rhythm: sinus rhythm  Rate: normal  BPM: 59    Clinical impression: normal ECG            Interpretation Summary    · An abnormal monitor study.  · NSR WITH OCCASIONAL PACS, NSSVTS    Interpretation Summary       · Findings consistent with a normal ECG stress test.  · Left ventricular ejection fraction is normal. (Calculated EF = 57%).  · Myocardial perfusion imaging indicates a normal myocardial perfusion study with no evidence of ischemia.  · Impressions are consistent with a low risk study.    Interpretation Summary    · Estimated right ventricular systolic pressure from tricuspid regurgitation is normal (<35 mmHg).  · The left ventricular cavity is borderline dilated.  · Estimated left ventricular EF was in agreement with the calculated left ventricular EF. Left ventricular ejection fraction appears to be 46 - 50%.  · Left ventricular diastolic function was indeterminate.  · Saline test neg for PFO          Current Outpatient Medications:   •  albuterol sulfate  (90 Base) MCG/ACT inhaler,  Inhale 2 puffs Every 4 (Four) Hours As Needed for Wheezing., Disp: , Rfl:   •  amiodarone (PACERONE) 200 MG tablet, Take 1 tablet by mouth Daily., Disp: 30 tablet, Rfl: 5  •  Calcet Petites 200-250 MG-UNIT tablet, TAKE TWO TABLETS BY MOUTH EVERY DAY, Disp: , Rfl:   •  cetirizine (zyrTEC) 10 MG tablet, Take 10 mg by mouth As Needed., Disp: , Rfl:   •  cholecalciferol (VITAMIN D3) 10 MCG (400 UNIT) tablet, Take 400 Units by mouth Daily., Disp: , Rfl:   •  diazePAM (VALIUM) 5 MG tablet, Take 5 mg by mouth 2 (Two) Times a Day., Disp: , Rfl:   •  Eliquis 5 MG tablet tablet, Take 5 mg by mouth 2 (Two) Times a Day., Disp: , Rfl:   •  furosemide (LASIX) 20 MG tablet, Take 20 mg by mouth Daily., Disp: , Rfl:   •  metoprolol succinate XL (TOPROL-XL) 25 MG 24 hr tablet, Take 1 tablet by mouth Daily., Disp: 30 tablet, Rfl: 11  •  oxyCODONE-acetaminophen (PERCOCET)  MG per tablet, Take 1 tablet by mouth Every 6 (Six) Hours As Needed., Disp: , Rfl:   •  potassium chloride 10 MEQ CR tablet, Take 10 mEq by mouth Daily., Disp: , Rfl:   •  tiZANidine (ZANAFLEX) 4 MG tablet, TK 1 T PO TID PRN, Disp: , Rfl:   •  vitamin B-12 (CYANOCOBALAMIN) 1000 MCG tablet, Take 1,000 mcg by mouth Daily., Disp: , Rfl:   •  vitamin C (ASCORBIC ACID) 250 MG tablet, Take 250 mg by mouth Daily., Disp: , Rfl:      Assessment:        Patient Active Problem List   Diagnosis   • Abnormal EKG   • Atrial fibrillation (HCC)   • Pre-operative cardiovascular examination   • History of amiodarone therapy   • Chronic anticoagulation   • Bradycardia, sinus               Plan:   1.  Paroxysmal atrial fibrillation: decrease amiodarone to 100mg daily. Anticoagulated with eliquis.     Pros and cons of this new medication / change medication has been explained to  the patient. Possible side effects has been explained. Associated need of the blood  Work has been explained. Need for the compliance of the medication has been explained.    2.  Bradycardia: stable    3.   Hypertension: bp controlled on current management    Importance of controlling hypertension and blood pressure  checkup on the regular basis has been explained. Hypertension as a silent killer has been discussed. Risk reduction of the weight and regular exercises to control the hypertension has been explained.    4. Amiodarone therapy: decrease to 100mg daily. Amio check in 6 months    Significant side effects associated with AMIODARONE therapy has been explained. Pros and cons of the medications has been discussed, decision has been to continue the medication   6 months to yearly checkup for eye examination, thyroid function test, chest x-ray and liver function test has been advised.  Patient understands well.    5. Anticoagulated: continue eliquis.     Pros and cons as well as indication of the anticoagulation has been explained to the patient in detail. There are no obvious complications at this stage. Risk of  the bleedings has been explained. Need for the regular blood workup and adjust the dose has been explained. Need for proper follow-up on anticoagulation also has been explained.                  No diagnosis found.    There are no diagnoses linked to this encounter.    COUNSELING: Princess Girard was given to patient for the following topics: diagnostic results, risk factor reductions, impressions, risks and benefits of treatment options and importance of treatment compliance .       SMOKING COUNSELING: Denies    Decrease amiodarone to 100 mg daily. Follow up in 3 months or sooner if needed. Amio check in 6 months      Sincerely,   HAYLEY Ricketts  [facility]  05/17/22  11:37 EDT    EMR Dragon/Transcription disclaimer:   Much of this encounter note is an electronic transcription/translation of spoken language to printed text. The electronic translation of spoken language may permit erroneous, or at times, nonsensical words or phrases to be inadvertently transcribed;  Although I have reviewed the note for such errors, some may still exist.

## 2022-08-23 ENCOUNTER — HOSPITAL ENCOUNTER (OUTPATIENT)
Dept: CARDIOLOGY | Facility: HOSPITAL | Age: 72
Discharge: HOME | End: 2022-08-23

## 2022-08-23 ENCOUNTER — OFFICE VISIT (OUTPATIENT)
Dept: CARDIOLOGY | Facility: CLINIC | Age: 72
End: 2022-08-23

## 2022-08-23 VITALS
WEIGHT: 238 LBS | HEART RATE: 103 BPM | BODY MASS INDEX: 36.07 KG/M2 | DIASTOLIC BLOOD PRESSURE: 86 MMHG | HEIGHT: 68 IN | SYSTOLIC BLOOD PRESSURE: 133 MMHG

## 2022-08-23 DIAGNOSIS — Z79.01 CHRONIC ANTICOAGULATION: ICD-10-CM

## 2022-08-23 DIAGNOSIS — I48.0 PAROXYSMAL ATRIAL FIBRILLATION: ICD-10-CM

## 2022-08-23 DIAGNOSIS — I10 PRIMARY HYPERTENSION: ICD-10-CM

## 2022-08-23 DIAGNOSIS — R00.1 BRADYCARDIA, SINUS: ICD-10-CM

## 2022-08-23 DIAGNOSIS — I48.0 PAROXYSMAL ATRIAL FIBRILLATION: Primary | ICD-10-CM

## 2022-08-23 DIAGNOSIS — Z92.29 HISTORY OF AMIODARONE THERAPY: ICD-10-CM

## 2022-08-23 LAB
ALBUMIN SERPL-MCNC: 4.4 G/DL (ref 3.5–5.2)
ALBUMIN/GLOB SERPL: 1.8 G/DL
ALP SERPL-CCNC: 95 U/L (ref 39–117)
ALT SERPL W P-5'-P-CCNC: 8 U/L (ref 1–33)
ANION GAP SERPL CALCULATED.3IONS-SCNC: 8 MMOL/L (ref 5–15)
AST SERPL-CCNC: 14 U/L (ref 1–32)
BILIRUB SERPL-MCNC: 0.4 MG/DL (ref 0–1.2)
BUN SERPL-MCNC: 18 MG/DL (ref 8–23)
BUN/CREAT SERPL: 19.8 (ref 7–25)
CALCIUM SPEC-SCNC: 9.5 MG/DL (ref 8.6–10.5)
CHLORIDE SERPL-SCNC: 104 MMOL/L (ref 98–107)
CO2 SERPL-SCNC: 28 MMOL/L (ref 22–29)
CREAT SERPL-MCNC: 0.91 MG/DL (ref 0.57–1)
EGFRCR SERPLBLD CKD-EPI 2021: 67.2 ML/MIN/1.73
GLOBULIN UR ELPH-MCNC: 2.5 GM/DL
GLUCOSE SERPL-MCNC: 71 MG/DL (ref 65–99)
POTASSIUM SERPL-SCNC: 4.6 MMOL/L (ref 3.5–5.2)
PROT SERPL-MCNC: 6.9 G/DL (ref 6–8.5)
SODIUM SERPL-SCNC: 140 MMOL/L (ref 136–145)
T-UPTAKE NFR SERPL: 1.07 TBI (ref 0.8–1.3)
T4 SERPL-MCNC: 8.17 MCG/DL (ref 4.5–11.7)
TSH SERPL DL<=0.05 MIU/L-ACNC: 0.66 UIU/ML (ref 0.27–4.2)

## 2022-08-23 PROCEDURE — 36415 COLL VENOUS BLD VENIPUNCTURE: CPT

## 2022-08-23 PROCEDURE — 84443 ASSAY THYROID STIM HORMONE: CPT

## 2022-08-23 PROCEDURE — 84479 ASSAY OF THYROID (T3 OR T4): CPT

## 2022-08-23 PROCEDURE — 80053 COMPREHEN METABOLIC PANEL: CPT

## 2022-08-23 PROCEDURE — 84436 ASSAY OF TOTAL THYROXINE: CPT

## 2022-08-23 PROCEDURE — 99214 OFFICE O/P EST MOD 30 MIN: CPT

## 2022-08-23 PROCEDURE — 93000 ELECTROCARDIOGRAM COMPLETE: CPT

## 2022-08-23 RX ORDER — METOPROLOL SUCCINATE 25 MG/1
25 TABLET, EXTENDED RELEASE ORAL DAILY
Qty: 30 TABLET | Refills: 11 | Status: SHIPPED | OUTPATIENT
Start: 2022-08-23 | End: 2022-11-04

## 2022-08-23 RX ORDER — AMIODARONE HYDROCHLORIDE 200 MG/1
100 TABLET ORAL
Qty: 30 TABLET | Refills: 11 | Status: SHIPPED | OUTPATIENT
Start: 2022-08-23 | End: 2022-08-23 | Stop reason: SDUPTHER

## 2022-08-23 RX ORDER — AMIODARONE HYDROCHLORIDE 200 MG/1
200 TABLET ORAL
Qty: 30 TABLET | Refills: 11 | Status: SHIPPED | OUTPATIENT
Start: 2022-08-23 | End: 2022-08-23

## 2022-08-23 RX ORDER — METOPROLOL SUCCINATE 25 MG/1
25 TABLET, EXTENDED RELEASE ORAL DAILY
Qty: 30 TABLET | Refills: 11 | Status: SHIPPED | OUTPATIENT
Start: 2022-08-23 | End: 2022-08-23 | Stop reason: SDUPTHER

## 2022-08-23 RX ORDER — AMIODARONE HYDROCHLORIDE 200 MG/1
200 TABLET ORAL
Qty: 30 TABLET | Refills: 11 | Status: SHIPPED | OUTPATIENT
Start: 2022-08-23

## 2022-09-20 ENCOUNTER — OFFICE VISIT (OUTPATIENT)
Dept: CARDIOLOGY | Facility: CLINIC | Age: 72
End: 2022-09-20

## 2022-09-20 VITALS
HEART RATE: 91 BPM | BODY MASS INDEX: 37.03 KG/M2 | SYSTOLIC BLOOD PRESSURE: 149 MMHG | HEIGHT: 69 IN | WEIGHT: 250 LBS | DIASTOLIC BLOOD PRESSURE: 81 MMHG

## 2022-09-20 DIAGNOSIS — R00.1 BRADYCARDIA, SINUS: ICD-10-CM

## 2022-09-20 DIAGNOSIS — I10 PRIMARY HYPERTENSION: ICD-10-CM

## 2022-09-20 DIAGNOSIS — I48.19 PERSISTENT ATRIAL FIBRILLATION: Primary | ICD-10-CM

## 2022-09-20 DIAGNOSIS — Z79.01 CHRONIC ANTICOAGULATION: ICD-10-CM

## 2022-09-20 DIAGNOSIS — Z92.29 HISTORY OF AMIODARONE THERAPY: ICD-10-CM

## 2022-09-20 PROCEDURE — 99214 OFFICE O/P EST MOD 30 MIN: CPT

## 2022-09-20 PROCEDURE — 93000 ELECTROCARDIOGRAM COMPLETE: CPT

## 2022-10-07 ENCOUNTER — HOSPITAL ENCOUNTER (OUTPATIENT)
Dept: CARDIOLOGY | Facility: HOSPITAL | Age: 72
Discharge: HOME | End: 2022-10-07

## 2022-10-07 DIAGNOSIS — I48.19 PERSISTENT ATRIAL FIBRILLATION: ICD-10-CM

## 2022-10-07 LAB
ANION GAP SERPL CALCULATED.3IONS-SCNC: 9.7 MMOL/L (ref 5–15)
APTT PPP: 40.9 SECONDS (ref 22.7–35.4)
BUN SERPL-MCNC: 19 MG/DL (ref 8–23)
BUN/CREAT SERPL: 19.8 (ref 7–25)
CALCIUM SPEC-SCNC: 9.5 MG/DL (ref 8.6–10.5)
CHLORIDE SERPL-SCNC: 103 MMOL/L (ref 98–107)
CO2 SERPL-SCNC: 28.3 MMOL/L (ref 22–29)
CREAT SERPL-MCNC: 0.96 MG/DL (ref 0.57–1)
DEPRECATED RDW RBC AUTO: 43.3 FL (ref 37–54)
EGFRCR SERPLBLD CKD-EPI 2021: 62.6 ML/MIN/1.73
EOSINOPHIL # BLD MANUAL: 0.35 10*3/MM3 (ref 0–0.4)
EOSINOPHIL NFR BLD MANUAL: 10.3 % (ref 0.3–6.2)
ERYTHROCYTE [DISTWIDTH] IN BLOOD BY AUTOMATED COUNT: 13.7 % (ref 12.3–15.4)
GLUCOSE SERPL-MCNC: 87 MG/DL (ref 65–99)
HCT VFR BLD AUTO: 40.4 % (ref 34–46.6)
HGB BLD-MCNC: 12 G/DL (ref 12–15.9)
INR PPP: 1.06 (ref 0.9–1.1)
LYMPHOCYTES # BLD MANUAL: 1.73 10*3/MM3 (ref 0.7–3.1)
LYMPHOCYTES NFR BLD MANUAL: 12.4 % (ref 5–12)
MCH RBC QN AUTO: 26 PG (ref 26.6–33)
MCHC RBC AUTO-ENTMCNC: 29.7 G/DL (ref 31.5–35.7)
MCV RBC AUTO: 87.6 FL (ref 79–97)
MONOCYTES # BLD: 0.42 10*3/MM3 (ref 0.1–0.9)
NEUTROPHILS # BLD AUTO: 0.86 10*3/MM3 (ref 1.7–7)
NEUTROPHILS NFR BLD MANUAL: 25.8 % (ref 42.7–76)
PLAT MORPH BLD: NORMAL
PLATELET # BLD AUTO: 123 10*3/MM3 (ref 140–450)
PMV BLD AUTO: 13.5 FL (ref 6–12)
POTASSIUM SERPL-SCNC: 4.6 MMOL/L (ref 3.5–5.2)
PROTHROMBIN TIME: 14 SECONDS (ref 11.7–14.2)
RBC # BLD AUTO: 4.61 10*6/MM3 (ref 3.77–5.28)
RBC MORPH BLD: NORMAL
SODIUM SERPL-SCNC: 141 MMOL/L (ref 136–145)
VARIANT LYMPHS NFR BLD MANUAL: 51.5 % (ref 19.6–45.3)
WBC MORPH BLD: NORMAL
WBC NRBC COR # BLD: 3.35 10*3/MM3 (ref 3.4–10.8)

## 2022-10-07 PROCEDURE — 85610 PROTHROMBIN TIME: CPT

## 2022-10-07 PROCEDURE — 85025 COMPLETE CBC W/AUTO DIFF WBC: CPT

## 2022-10-07 PROCEDURE — 80048 BASIC METABOLIC PNL TOTAL CA: CPT

## 2022-10-07 PROCEDURE — 85730 THROMBOPLASTIN TIME PARTIAL: CPT

## 2022-10-07 PROCEDURE — 85007 BL SMEAR W/DIFF WBC COUNT: CPT

## 2022-10-07 PROCEDURE — 36415 COLL VENOUS BLD VENIPUNCTURE: CPT | Performed by: INTERNAL MEDICINE

## 2022-10-14 ENCOUNTER — HOSPITAL ENCOUNTER (OUTPATIENT)
Dept: CARDIOLOGY | Facility: HOSPITAL | Age: 72
Discharge: HOME | End: 2022-10-14
Admitting: INTERNAL MEDICINE

## 2022-10-14 VITALS
SYSTOLIC BLOOD PRESSURE: 157 MMHG | HEIGHT: 68 IN | BODY MASS INDEX: 36.37 KG/M2 | WEIGHT: 240 LBS | HEART RATE: 55 BPM | OXYGEN SATURATION: 97 % | TEMPERATURE: 98 F | DIASTOLIC BLOOD PRESSURE: 94 MMHG | RESPIRATION RATE: 18 BRPM

## 2022-10-14 DIAGNOSIS — I48.19 PERSISTENT ATRIAL FIBRILLATION: ICD-10-CM

## 2022-10-14 LAB
MAXIMAL PREDICTED HEART RATE: 147 BPM
QT INTERVAL: 442 MS
STRESS TARGET HR: 125 BPM

## 2022-10-14 PROCEDURE — 93005 ELECTROCARDIOGRAM TRACING: CPT | Performed by: INTERNAL MEDICINE

## 2022-10-14 PROCEDURE — 92960 CARDIOVERSION ELECTRIC EXT: CPT

## 2022-10-14 PROCEDURE — 92960 CARDIOVERSION ELECTRIC EXT: CPT | Performed by: INTERNAL MEDICINE

## 2022-10-14 RX ORDER — SODIUM CHLORIDE 0.9 % (FLUSH) 0.9 %
10 SYRINGE (ML) INJECTION AS NEEDED
Status: DISCONTINUED | OUTPATIENT
Start: 2022-10-14 | End: 2022-10-14 | Stop reason: HOSPADM

## 2022-10-14 RX ORDER — SODIUM CHLORIDE 0.9 % (FLUSH) 0.9 %
10 SYRINGE (ML) INJECTION EVERY 12 HOURS SCHEDULED
Status: DISCONTINUED | OUTPATIENT
Start: 2022-10-14 | End: 2022-10-14 | Stop reason: HOSPADM

## 2022-10-14 RX ORDER — SODIUM CHLORIDE 9 MG/ML
75 INJECTION, SOLUTION INTRAVENOUS CONTINUOUS
Status: DISCONTINUED | OUTPATIENT
Start: 2022-10-14 | End: 2022-10-15 | Stop reason: HOSPADM

## 2022-10-14 RX ADMIN — SODIUM CHLORIDE 75 ML/HR: 9 INJECTION, SOLUTION INTRAVENOUS at 08:53

## 2022-10-14 RX ADMIN — Medication 40 MG: at 09:33

## 2022-10-14 NOTE — SIGNIFICANT NOTE
Dr. Wilson at bedside discussing results of cardioversion and plan of care.  12 lead EKG complete.

## 2022-10-28 RX ORDER — APIXABAN 5 MG/1
5 TABLET, FILM COATED ORAL 2 TIMES DAILY
Qty: 60 TABLET | Refills: 5 | Status: SHIPPED | OUTPATIENT
Start: 2022-10-28

## 2022-11-04 ENCOUNTER — OFFICE VISIT (OUTPATIENT)
Dept: CARDIOLOGY | Facility: CLINIC | Age: 72
End: 2022-11-04

## 2022-11-04 VITALS
BODY MASS INDEX: 37.74 KG/M2 | DIASTOLIC BLOOD PRESSURE: 82 MMHG | SYSTOLIC BLOOD PRESSURE: 154 MMHG | HEART RATE: 44 BPM | WEIGHT: 249 LBS | HEIGHT: 68 IN

## 2022-11-04 DIAGNOSIS — R00.1 BRADYCARDIA, SINUS: ICD-10-CM

## 2022-11-04 DIAGNOSIS — I48.0 PAROXYSMAL ATRIAL FIBRILLATION: Primary | ICD-10-CM

## 2022-11-04 DIAGNOSIS — Z79.899 ON AMIODARONE THERAPY: ICD-10-CM

## 2022-11-04 DIAGNOSIS — I10 PRIMARY HYPERTENSION: ICD-10-CM

## 2022-11-04 PROCEDURE — 99214 OFFICE O/P EST MOD 30 MIN: CPT

## 2022-11-04 PROCEDURE — 93000 ELECTROCARDIOGRAM COMPLETE: CPT

## 2022-11-04 RX ORDER — ACYCLOVIR 800 MG/1
TABLET ORAL
COMMUNITY
Start: 2022-10-31

## 2022-11-04 RX ORDER — METOPROLOL SUCCINATE 25 MG/1
12.5 TABLET, EXTENDED RELEASE ORAL DAILY
Qty: 30 TABLET | Refills: 11 | Status: SHIPPED | OUTPATIENT
Start: 2022-11-04

## 2022-11-04 RX ORDER — AMLODIPINE BESYLATE 5 MG/1
5 TABLET ORAL DAILY
Qty: 30 TABLET | Refills: 11 | Status: SHIPPED | OUTPATIENT
Start: 2022-11-04

## 2022-11-04 NOTE — PROGRESS NOTES
Subjective:        Denia Merritt is a 72 y.o. female who here for follow up    Chief Complaint   Patient presents with   • Follow-up     Hospital cardioversion        Atrial Fibrillation  Past medical history includes atrial fibrillation.     Denia Merritt is a 72 year old female with paroxysmal atrial fibrillation a/c on eliquis and on amiodarone therapy, hypertension, hyperlipidemia, chronic pain. She follows up in office today s/p successful cardioversion on 10/14/22.         The following portions of the patient's history were reviewed and updated as appropriate: allergies, current medications, past family history, past medical history, past social history, past surgical history and problem list.    Past Medical History:   Diagnosis Date   • Arrhythmia     atrial fib   • Atrial fibrillation (HCC)    • Chronic pain    • Hyperlipidemia    • Hypertension    • Lumbar herniated disc    • Neuropathy    • Spinal stenosis          reports that she quit smoking about 21 years ago. She has never used smokeless tobacco. She reports that she does not currently use alcohol. She reports that she does not use drugs.     Family History   Problem Relation Age of Onset   • Heart disease Mother    • Diabetes Father    • Hypertension Neg Hx    • Hyperlipidemia Neg Hx    • Heart failure Neg Hx    • Heart attack Neg Hx        ROS     Review of Systems  Constitutional: no wt loss, fever, fatigue  Gastrointestinal: no nausea, abdominal pain  Behavioral/Psych: no insomnia or anxiety  Cardiovascular no chest pain or shortness of breath      Objective:           Vitals and nursing note reviewed.   Constitutional:       Appearance: Well-developed.   HENT:      Head: Normocephalic.      Right Ear: External ear normal.      Left Ear: External ear normal.   Neck:      Vascular: No JVD.   Pulmonary:      Effort: Pulmonary effort is normal. No respiratory distress.      Breath sounds: Normal breath sounds. No stridor. No rales.    Cardiovascular:      Normal rate. Regular rhythm.      No gallop.   Pulses:     Intact distal pulses.   Abdominal:      General: Bowel sounds are normal. There is no distension.      Palpations: Abdomen is soft.      Tenderness: There is no abdominal tenderness. There is no guarding.   Musculoskeletal: Normal range of motion.         General: No tenderness.      Cervical back: Normal range of motion. Skin:     General: Skin is warm.   Neurological:      Mental Status: Alert and oriented to person, place, and time.      Deep Tendon Reflexes: Reflexes are normal and symmetric.   Psychiatric:         Judgment: Judgment normal.           ECG 12 Lead    Date/Time: 11/4/2022 9:32 AM  Performed by: Radha Lake APRN  Authorized by: Radha Lake APRN   Comparison: compared with previous ECG from 10/14/2022  Similar to previous ECG  Rhythm: sinus bradycardia  Rate: bradycardic  BPM: 45  Other findings: non-specific ST-T wave changes    Clinical impression: non-specific ECG            Interpretation Summary    · An abnormal monitor study.  · NSR WITH OCCASIONAL PACS, NSSVTS    Interpretation Summary       · Findings consistent with a normal ECG stress test.  · Left ventricular ejection fraction is normal. (Calculated EF = 57%).  · Myocardial perfusion imaging indicates a normal myocardial perfusion study with no evidence of ischemia.  · Impressions are consistent with a low risk study.    Interpretation Summary    · Estimated right ventricular systolic pressure from tricuspid regurgitation is normal (<35 mmHg).  · The left ventricular cavity is borderline dilated.  · Estimated left ventricular EF was in agreement with the calculated left ventricular EF. Left ventricular ejection fraction appears to be 46 - 50%.  · Left ventricular diastolic function was indeterminate.  · Saline test neg for PFO          Current Outpatient Medications:   •  acyclovir (ZOVIRAX) 800 MG tablet, TAKE ONE TABLET BY MOUTH FIVE TIMES A  DAY FOR 10 DAYS, Disp: , Rfl:   •  albuterol sulfate  (90 Base) MCG/ACT inhaler, Inhale 2 puffs Every 4 (Four) Hours As Needed for Wheezing., Disp: , Rfl:   •  amiodarone (PACERONE) 200 MG tablet, Take 1 tablet by mouth Daily., Disp: 30 tablet, Rfl: 11  •  Calcet Petites 200-250 MG-UNIT tablet, TAKE TWO TABLETS BY MOUTH EVERY DAY, Disp: , Rfl:   •  cetirizine (zyrTEC) 10 MG tablet, Take 10 mg by mouth As Needed., Disp: , Rfl:   •  cholecalciferol (VITAMIN D3) 10 MCG (400 UNIT) tablet, Take 400 Units by mouth Daily., Disp: , Rfl:   •  diazePAM (VALIUM) 5 MG tablet, Take 5 mg by mouth 2 (Two) Times a Day., Disp: , Rfl:   •  Eliquis 5 MG tablet tablet, Take 1 tablet by mouth 2 (Two) Times a Day., Disp: 60 tablet, Rfl: 5  •  furosemide (LASIX) 20 MG tablet, Take 20 mg by mouth Daily., Disp: , Rfl:   •  metoprolol succinate XL (TOPROL-XL) 25 MG 24 hr tablet, Take 1 tablet by mouth Daily., Disp: 30 tablet, Rfl: 11  •  oxyCODONE-acetaminophen (PERCOCET)  MG per tablet, Take 1 tablet by mouth Every 6 (Six) Hours As Needed., Disp: , Rfl:   •  potassium chloride 10 MEQ CR tablet, Take 10 mEq by mouth Daily., Disp: , Rfl:   •  tiZANidine (ZANAFLEX) 4 MG tablet, TK 1 T PO TID PRN, Disp: , Rfl:   •  vitamin B-12 (CYANOCOBALAMIN) 1000 MCG tablet, Take 1,000 mcg by mouth Daily., Disp: , Rfl:   •  vitamin C (ASCORBIC ACID) 250 MG tablet, Take 250 mg by mouth Daily., Disp: , Rfl:      Assessment:        Patient Active Problem List   Diagnosis   • Abnormal EKG   • Atrial fibrillation (HCC)   • Pre-operative cardiovascular examination   • History of amiodarone therapy   • Chronic anticoagulation   • Bradycardia, sinus               Plan:   1.  Paroxysmal atrial fibrillation on amiodarone therapy: continue eliquis, and amiodarone. Follow up in 1 month probably decrease amiodarone to 100mg at that time.     Pros and cons of this new medication / change medication has been explained to  the patient. Possible side effects has  been explained. Associated need of the blood  Work has been explained. Need for the compliance of the medication has been explained.      2.  Bradycardia: asymptomaticHR 45 today. I will decrease metorprolol to 12.5mg dialy    Pros and cons of this new medication / change medication has been explained to  the patient. Possible side effects has been explained. Associated need of the blood  Work has been explained. Need for the compliance of the medication has been explained    3.  Hypertension: elevated today, she was taken off amlodipine last year when she had metoprolol increased. I will add amlodipine 5mg back.     4. Amiodarone therapy: bradycardia, qtc stable.                      No diagnosis found.    There are no diagnoses linked to this encounter.    COUNSELING: jennifer Girard was given to patient for the following topics: diagnostic results, risk factor reductions, impressions, risks and benefits of treatment options and importance of treatment compliance .       SMOKING COUNSELING: denies    Decrease metoprolol to 12.5mg daily  Start taking amlodipine 5mg daily  Follow up in 1 month may decrease amiodarone to 100mg at that time.       Sincerely,   HAYLEY Ricketts  [facility]  11/04/22  09:29 EDT    EMR Dragon/Transcription disclaimer:   Much of this encounter note is an electronic transcription/translation of spoken language to printed text. The electronic translation of spoken language may permit erroneous, or at times, nonsensical words or phrases to be inadvertently transcribed; Although I have reviewed the note for such errors, some may still exist.

## 2022-12-16 ENCOUNTER — OFFICE VISIT (OUTPATIENT)
Dept: CARDIOLOGY | Facility: CLINIC | Age: 72
End: 2022-12-16

## 2022-12-16 VITALS
BODY MASS INDEX: 36.29 KG/M2 | DIASTOLIC BLOOD PRESSURE: 73 MMHG | HEIGHT: 69 IN | WEIGHT: 245 LBS | HEART RATE: 54 BPM | SYSTOLIC BLOOD PRESSURE: 154 MMHG

## 2022-12-16 DIAGNOSIS — Z79.899 ON AMIODARONE THERAPY: Primary | ICD-10-CM

## 2022-12-16 DIAGNOSIS — R00.1 BRADYCARDIA, SINUS: ICD-10-CM

## 2022-12-16 DIAGNOSIS — I10 PRIMARY HYPERTENSION: ICD-10-CM

## 2022-12-16 DIAGNOSIS — I48.0 PAROXYSMAL ATRIAL FIBRILLATION: ICD-10-CM

## 2022-12-16 PROCEDURE — 99213 OFFICE O/P EST LOW 20 MIN: CPT

## 2022-12-16 PROCEDURE — 93000 ELECTROCARDIOGRAM COMPLETE: CPT

## 2022-12-16 NOTE — PROGRESS NOTES
Subjective:        Denia Merritt is a 72 y.o. female who here for follow up    Chief Complaint   Patient presents with   • Atrial Fibrillation     FOLLOW UP       Atrial Fibrillation  Past medical history includes atrial fibrillation.       Denia Merritt is a 72 year old female who follows up in office today for management of atrial fibrillation.  She has atrial fibrillation anticoagulant Eliquis and on amiodarone therapy, hyperlipidemia, hypertension, chronic pain.  She underwent successful cardioversion 10/14/2022.  Prior cardioversion 1/20/2022.  Echo 1/12/2021 EF 47%, LV C borderline dilated, indeterminate LV diastolic function.  Stress test 1/12/2021 myocardial fusion imaging indicates a normal study with no evidence of ischemia.        The following portions of the patient's history were reviewed and updated as appropriate: allergies, current medications, past family history, past medical history, past social history, past surgical history and problem list.    Past Medical History:   Diagnosis Date   • Arrhythmia     atrial fib   • Atrial fibrillation (HCC)    • Chronic pain    • Hyperlipidemia    • Hypertension    • Lumbar herniated disc    • Neuropathy    • Spinal stenosis          reports that she quit smoking about 21 years ago. She has never used smokeless tobacco. She reports that she does not currently use alcohol. She reports that she does not use drugs.     Family History   Problem Relation Age of Onset   • Heart disease Mother    • Diabetes Father    • Hypertension Neg Hx    • Hyperlipidemia Neg Hx    • Heart failure Neg Hx    • Heart attack Neg Hx        ROS     Review of Systems  Constitutional: no wt loss, fever, fatigue  Gastrointestinal: no nausea, abdominal pain  Behavioral/Psych: no insomnia or anxiety  Cardiovascular no chest pain or shortness of breath      Objective:           Vitals and nursing note reviewed.   Constitutional:       Appearance: Well-developed.   HENT:      Head:  Normocephalic.      Right Ear: External ear normal.      Left Ear: External ear normal.   Neck:      Vascular: No JVD.   Pulmonary:      Effort: Pulmonary effort is normal. No respiratory distress.      Breath sounds: Normal breath sounds. No stridor. No rales.   Cardiovascular:      Normal rate. Regular rhythm.      No gallop.   Pulses:     Intact distal pulses.   Abdominal:      General: Bowel sounds are normal. There is no distension.      Palpations: Abdomen is soft.      Tenderness: There is no abdominal tenderness. There is no guarding.   Musculoskeletal: Normal range of motion.         General: No tenderness.      Cervical back: Normal range of motion. Skin:     General: Skin is warm.   Neurological:      Mental Status: Alert and oriented to person, place, and time.      Deep Tendon Reflexes: Reflexes are normal and symmetric.   Psychiatric:         Judgment: Judgment normal.           ECG 12 Lead    Date/Time: 12/16/2022 9:28 AM  Performed by: Radha Lake APRN  Authorized by: Radha Lake APRN   Comparison: compared with previous ECG from 11/4/2022  Similar to previous ECG  Comparison to previous ECG: Rate improved from previous  Rhythm: sinus bradycardia  Rate: normal  BPM: 51  ST Flattening: all  Other findings comments: QTc 404    Clinical impression: abnormal EKG            Interpretation Summary    · An abnormal monitor study.  · NSR WITH OCCASIONAL PACS, NSSVTS    Interpretation Summary       · Findings consistent with a normal ECG stress test.  · Left ventricular ejection fraction is normal. (Calculated EF = 57%).  · Myocardial perfusion imaging indicates a normal myocardial perfusion study with no evidence of ischemia.  · Impressions are consistent with a low risk study.    Interpretation Summary    · Estimated right ventricular systolic pressure from tricuspid regurgitation is normal (<35 mmHg).  · The left ventricular cavity is borderline dilated.  · Estimated left ventricular EF was in  agreement with the calculated left ventricular EF. Left ventricular ejection fraction appears to be 46 - 50%.  · Left ventricular diastolic function was indeterminate.  · Saline test neg for PFO          Current Outpatient Medications:   •  acyclovir (ZOVIRAX) 800 MG tablet, TAKE ONE TABLET BY MOUTH FIVE TIMES A DAY FOR 10 DAYS, Disp: , Rfl:   •  albuterol sulfate  (90 Base) MCG/ACT inhaler, Inhale 2 puffs Every 4 (Four) Hours As Needed for Wheezing., Disp: , Rfl:   •  amiodarone (PACERONE) 200 MG tablet, Take 1 tablet by mouth Daily., Disp: 30 tablet, Rfl: 11  •  amLODIPine (NORVASC) 5 MG tablet, Take 1 tablet by mouth Daily., Disp: 30 tablet, Rfl: 11  •  Calcet Petites 200-250 MG-UNIT tablet, TAKE TWO TABLETS BY MOUTH EVERY DAY, Disp: , Rfl:   •  cetirizine (zyrTEC) 10 MG tablet, Take 10 mg by mouth As Needed., Disp: , Rfl:   •  cholecalciferol (VITAMIN D3) 10 MCG (400 UNIT) tablet, Take 400 Units by mouth Daily., Disp: , Rfl:   •  diazePAM (VALIUM) 5 MG tablet, Take 5 mg by mouth 2 (Two) Times a Day., Disp: , Rfl:   •  Eliquis 5 MG tablet tablet, Take 1 tablet by mouth 2 (Two) Times a Day., Disp: 60 tablet, Rfl: 5  •  furosemide (LASIX) 20 MG tablet, Take 20 mg by mouth Daily., Disp: , Rfl:   •  metoprolol succinate XL (TOPROL-XL) 25 MG 24 hr tablet, Take 0.5 tablets by mouth Daily., Disp: 30 tablet, Rfl: 11  •  oxyCODONE-acetaminophen (PERCOCET)  MG per tablet, Take 1 tablet by mouth Every 6 (Six) Hours As Needed., Disp: , Rfl:   •  potassium chloride 10 MEQ CR tablet, Take 10 mEq by mouth Daily., Disp: , Rfl:   •  tiZANidine (ZANAFLEX) 4 MG tablet, TK 1 T PO TID PRN, Disp: , Rfl:   •  vitamin B-12 (CYANOCOBALAMIN) 1000 MCG tablet, Take 1,000 mcg by mouth Daily., Disp: , Rfl:   •  vitamin C (ASCORBIC ACID) 250 MG tablet, Take 250 mg by mouth Daily., Disp: , Rfl:      Assessment:        Patient Active Problem List   Diagnosis   • Abnormal EKG   • Atrial fibrillation (HCC)   • Pre-operative  cardiovascular examination   • History of amiodarone therapy   • Chronic anticoagulation   • Bradycardia, sinus               Plan:   1.  Paroxysmal atrial fibrillation on amiodarone therapy: anticoagulated on eliquis. No s/s bleeding. Decrease amio to 100mg in one month. Amnio check 6 months.    Pros and cons of this new medication / change medication has been explained to  the patient. Possible side effects has been explained. Associated need of the blood  Work has been explained. Need for the compliance of the medication has been explained.    2.  Bradycardia: improved. She has been taking 25mg metoprolol.     3.  Hypertension: she did not take metoprolol or amlodipine this morning. She does not have bp machine at home.  There is an issue with noncompliance.  I have encouraged her to get blood pressure monitor at home.  I would like to increase amlodipine she does not want to and says that she will get her blood pressure checked at her primary care doctor's office next week.  I will follow-up with her via phone call to confirm.    Importance of controlling hypertension and blood pressure  checkup on the regular basis has been explained. Hypertension as a silent killer has been discussed. Risk reduction of the weight and regular exercises to control the hypertension has been explained.                 No diagnosis found.    There are no diagnoses linked to this encounter.    COUNSELING:jennifer Girard was given to patient for the following topics: diagnostic results, risk factor reductions, impressions, risks and benefits of treatment options and importance of treatment compliance .       SMOKING COUNSELING: denies    6 months with amio check    Sincerely,   HAYLEY Ricketts  [facility]  12/16/22  09:28 EST    EMR Dragon/Transcription disclaimer:   Much of this encounter note is an electronic transcription/translation of spoken language to printed text. The electronic  translation of spoken language may permit erroneous, or at times, nonsensical words or phrases to be inadvertently transcribed; Although I have reviewed the note for such errors, some may still exist.

## 2023-06-14 ENCOUNTER — TELEPHONE (OUTPATIENT)
Dept: CARDIOLOGY | Facility: CLINIC | Age: 73
End: 2023-06-14

## 2023-06-14 NOTE — TELEPHONE ENCOUNTER
Caller: Denia Merritt    Relationship to patient: Self    Best call back number: [phone]    Chief complaint: NO SYMPTOMS     Type of visit: 6 MO FOLLOW UP    Requested date: MIDDLE OF AUGUST     If rescheduling, when is the original appointment: 6.15.23    Additional notes: PT IS CALLING TO RESCHEDULE APPT ON 6.15.23. NEXT AVAILABLE IS 1.4.23, PT SAID SHE WOULD LIKE TO BE SEEN SOONER. PLEASE ADVISE ON SCHEDULING.

## 2023-09-19 RX ORDER — AMIODARONE HYDROCHLORIDE 200 MG/1
200 TABLET ORAL
Qty: 30 TABLET | Refills: 3 | Status: SHIPPED | OUTPATIENT
Start: 2023-09-19

## 2023-11-20 RX ORDER — APIXABAN 5 MG/1
5 TABLET, FILM COATED ORAL 2 TIMES DAILY
Qty: 60 TABLET | Refills: 0 | Status: SHIPPED | OUTPATIENT
Start: 2023-11-20

## 2023-12-18 RX ORDER — AMIODARONE HYDROCHLORIDE 200 MG/1
200 TABLET ORAL
Qty: 30 TABLET | Refills: 0 | Status: SHIPPED | OUTPATIENT
Start: 2023-12-18 | End: 2023-12-18

## 2023-12-18 RX ORDER — AMIODARONE HYDROCHLORIDE 200 MG/1
200 TABLET ORAL
Qty: 30 TABLET | Refills: 0 | Status: SHIPPED | OUTPATIENT
Start: 2023-12-18

## 2023-12-18 RX ORDER — METOPROLOL SUCCINATE 25 MG/1
12.5 TABLET, EXTENDED RELEASE ORAL DAILY
Qty: 30 TABLET | Refills: 11 | OUTPATIENT
Start: 2023-12-18

## 2024-01-02 RX ORDER — METOPROLOL SUCCINATE 25 MG/1
25 TABLET, EXTENDED RELEASE ORAL DAILY
Qty: 30 TABLET | Refills: 0 | Status: SHIPPED | OUTPATIENT
Start: 2024-01-02

## 2024-01-17 RX ORDER — APIXABAN 5 MG/1
5 TABLET, FILM COATED ORAL 2 TIMES DAILY
Qty: 60 TABLET | Refills: 0 | Status: SHIPPED | OUTPATIENT
Start: 2024-01-17

## 2024-04-30 ENCOUNTER — OFFICE VISIT (OUTPATIENT)
Dept: CARDIOLOGY | Facility: CLINIC | Age: 74
End: 2024-04-30
Payer: OTHER GOVERNMENT

## 2024-04-30 VITALS
SYSTOLIC BLOOD PRESSURE: 147 MMHG | WEIGHT: 246 LBS | DIASTOLIC BLOOD PRESSURE: 75 MMHG | BODY MASS INDEX: 37.28 KG/M2 | HEART RATE: 70 BPM | HEIGHT: 68 IN

## 2024-04-30 DIAGNOSIS — I48.19 PERSISTENT ATRIAL FIBRILLATION: ICD-10-CM

## 2024-04-30 DIAGNOSIS — I10 PRIMARY HYPERTENSION: ICD-10-CM

## 2024-04-30 DIAGNOSIS — R06.02 SHORTNESS OF BREATH: ICD-10-CM

## 2024-04-30 DIAGNOSIS — R94.31 ABNORMAL ELECTROCARDIOGRAM: Primary | ICD-10-CM

## 2024-04-30 DIAGNOSIS — Z92.29 HISTORY OF AMIODARONE THERAPY: ICD-10-CM

## 2024-04-30 DIAGNOSIS — R79.9 ABNORMAL FINDING OF BLOOD CHEMISTRY, UNSPECIFIED: ICD-10-CM

## 2024-04-30 PROCEDURE — 93000 ELECTROCARDIOGRAM COMPLETE: CPT

## 2024-04-30 PROCEDURE — 99214 OFFICE O/P EST MOD 30 MIN: CPT
